# Patient Record
Sex: MALE | Race: OTHER | HISPANIC OR LATINO | Employment: OTHER | URBAN - METROPOLITAN AREA
[De-identification: names, ages, dates, MRNs, and addresses within clinical notes are randomized per-mention and may not be internally consistent; named-entity substitution may affect disease eponyms.]

---

## 2023-11-16 ENCOUNTER — OFFICE VISIT (OUTPATIENT)
Dept: URGENT CARE | Facility: CLINIC | Age: 68
End: 2023-11-16
Payer: COMMERCIAL

## 2023-11-16 VITALS
HEIGHT: 65 IN | TEMPERATURE: 98 F | WEIGHT: 174 LBS | BODY MASS INDEX: 28.99 KG/M2 | RESPIRATION RATE: 16 BRPM | HEART RATE: 68 BPM | OXYGEN SATURATION: 97 % | SYSTOLIC BLOOD PRESSURE: 140 MMHG | DIASTOLIC BLOOD PRESSURE: 95 MMHG

## 2023-11-16 DIAGNOSIS — M25.551 RIGHT HIP PAIN: ICD-10-CM

## 2023-11-16 DIAGNOSIS — M54.41 ACUTE RIGHT-SIDED LOW BACK PAIN WITH RIGHT-SIDED SCIATICA: Primary | ICD-10-CM

## 2023-11-16 PROCEDURE — 99202 PR OFFICE/OUTPT VISIT, NEW, LEVL II, 15-29 MIN: ICD-10-PCS | Mod: 25,S$GLB,, | Performed by: PHYSICIAN ASSISTANT

## 2023-11-16 PROCEDURE — 73502 XR HIP WITH PELVIS WHEN PERFORMED, 2 OR 3  VIEWS RIGHT: ICD-10-PCS | Mod: RT,S$GLB,, | Performed by: RADIOLOGY

## 2023-11-16 PROCEDURE — 99202 OFFICE O/P NEW SF 15 MIN: CPT | Mod: 25,S$GLB,, | Performed by: PHYSICIAN ASSISTANT

## 2023-11-16 PROCEDURE — 96372 THER/PROPH/DIAG INJ SC/IM: CPT | Mod: S$GLB,,, | Performed by: PHYSICIAN ASSISTANT

## 2023-11-16 PROCEDURE — 73502 X-RAY EXAM HIP UNI 2-3 VIEWS: CPT | Mod: RT,S$GLB,, | Performed by: RADIOLOGY

## 2023-11-16 PROCEDURE — 96372 PR INJECTION,THERAP/PROPH/DIAG2ST, IM OR SUBCUT: ICD-10-PCS | Mod: S$GLB,,, | Performed by: PHYSICIAN ASSISTANT

## 2023-11-16 RX ORDER — TIZANIDINE 4 MG/1
4 TABLET ORAL EVERY 6 HOURS PRN
Qty: 12 TABLET | Refills: 0 | Status: SHIPPED | OUTPATIENT
Start: 2023-11-16 | End: 2023-11-19

## 2023-11-16 RX ORDER — KETOROLAC TROMETHAMINE 30 MG/ML
30 INJECTION, SOLUTION INTRAMUSCULAR; INTRAVENOUS
Status: COMPLETED | OUTPATIENT
Start: 2023-11-16 | End: 2023-11-16

## 2023-11-16 RX ADMIN — KETOROLAC TROMETHAMINE 30 MG: 30 INJECTION, SOLUTION INTRAMUSCULAR; INTRAVENOUS at 12:11

## 2023-11-16 NOTE — PROGRESS NOTES
"Subjective:      Patient ID: Mina Viveros Sr. is a 68 y.o. male.    Vitals:  height is 5' 5" (1.651 m) and weight is 78.9 kg (174 lb). His oral temperature is 97.9 °F (36.6 °C). His blood pressure is 140/95 (abnormal) and his pulse is 68. His respiration is 16 and oxygen saturation is 97%.     Chief Complaint: Back Pain    Pt presents to urgent care with back/ right hip pain.  The pain radiates all the way down to his right knee.  The pain starts whenever he sits down. It has been going on for a few weeks now. He has been taking tylenol.  They even tried OTC medications, that melts under your tongue.     Back Pain  This is a new problem. The current episode started 1 to 4 weeks ago. The problem occurs constantly. The problem is unchanged. The pain radiates to the right thigh and right knee. The pain is mild. The symptoms are aggravated by sitting. Pertinent negatives include no abdominal pain, chest pain, fever, headaches or numbness. Treatments tried: tylenol. The treatment provided mild relief.       Constitution: Negative for chills, sweating, fatigue and fever.   HENT:  Negative for ear pain, drooling, congestion, sore throat, trouble swallowing and voice change.    Neck: Negative for neck pain, neck stiffness and painful lymph nodes.   Cardiovascular:  Negative for chest pain, leg swelling, palpitations, sob on exertion and passing out.   Eyes:  Negative for eye discharge, eye itching, eye pain, eye redness and eyelid swelling.   Respiratory:  Negative for chest tightness, cough, sputum production, bloody sputum, shortness of breath, stridor and wheezing.    Gastrointestinal:  Negative for abdominal pain, abdominal bloating, nausea, vomiting, constipation, diarrhea and heartburn.   Genitourinary:  Negative for urine decreased.   Musculoskeletal:  Positive for back pain and pain with walking. Negative for joint pain, joint swelling, abnormal ROM of joint, muscle cramps and muscle ache.   Skin:  Negative for " rash and hives.   Allergic/Immunologic: Negative for hives, itching and sneezing.   Neurological:  Negative for dizziness, light-headedness, passing out, loss of balance, headaches, altered mental status, loss of consciousness, numbness and seizures.   Hematologic/Lymphatic: Negative for swollen lymph nodes.   Psychiatric/Behavioral:  Negative for altered mental status and nervous/anxious. The patient is not nervous/anxious.       Objective:     Physical Exam   Constitutional: He is oriented to person, place, and time. He appears well-developed. He is cooperative.   HENT:   Head: Normocephalic and atraumatic.   Ears:   Right Ear: Hearing, tympanic membrane, external ear and ear canal normal.   Left Ear: Hearing, tympanic membrane, external ear and ear canal normal.   Nose: Nose normal. No mucosal edema or nasal deformity. No epistaxis. Right sinus exhibits no maxillary sinus tenderness and no frontal sinus tenderness. Left sinus exhibits no maxillary sinus tenderness and no frontal sinus tenderness.   Mouth/Throat: Uvula is midline, oropharynx is clear and moist and mucous membranes are normal. No trismus in the jaw. Normal dentition. No uvula swelling.   Eyes: Conjunctivae and lids are normal.   Neck: Trachea normal and phonation normal. Neck supple.   Cardiovascular: Normal rate, regular rhythm, normal heart sounds and normal pulses.   Pulmonary/Chest: Effort normal and breath sounds normal.   Abdominal: Normal appearance and bowel sounds are normal. Soft.   Musculoskeletal: Normal range of motion.         General: Normal range of motion.   Neurological: He is alert and oriented to person, place, and time. He exhibits normal muscle tone.   Skin: Skin is warm, dry and intact.   Psychiatric: His speech is normal and behavior is normal. Judgment and thought content normal.   Nursing note and vitals reviewed.      Assessment:     1. Acute right-sided low back pain with right-sided sciatica    2. Right hip pain         Plan:       Acute right-sided low back pain with right-sided sciatica    Right hip pain  -     X-Ray Hip 2 or 3 views Right (with Pelvis when performed); Future; Expected date: 11/16/2023  -     Ambulatory referral/consult to Family Practice    Other orders  -     ketorolac injection 30 mg  -     tiZANidine (ZANAFLEX) 4 MG tablet; Take 1 tablet (4 mg total) by mouth every 6 (six) hours as needed (hip pain).  Dispense: 12 tablet; Refill: 0        Patient Instructions     You must understand that you've received an Urgent Care treatment only and that you may be released before all your medical problems are known or treated. You, the patient, will arrange for follow up care as instructed.  Follow up with your PCP or specialty clinic as directed if not improved or as needed. You can call [phone] to schedule an appointment with the appropriate provider.  If your condition worsens we recommend that you receive another evaluation at the Emergency Department for any concerns or worsening of condition.  Patient aware and verbalized understanding.

## 2023-11-16 NOTE — PATIENT INSTRUCTIONS
You must understand that you've received an Urgent Care treatment only and that you may be released before all your medical problems are known or treated. You, the patient, will arrange for follow up care as instructed.  Follow up with your PCP or specialty clinic as directed if not improved or as needed. You can call [phone] to schedule an appointment with the appropriate provider.  If your condition worsens we recommend that you receive another evaluation at the Emergency Department for any concerns or worsening of condition.  Patient aware and verbalized understanding.

## 2024-01-11 ENCOUNTER — TELEPHONE (OUTPATIENT)
Dept: FAMILY MEDICINE | Facility: CLINIC | Age: 69
End: 2024-01-11
Payer: MEDICARE

## 2024-01-11 NOTE — TELEPHONE ENCOUNTER
----- Message from Chela Way sent at 1/10/2024  1:56 PM CST -----  Regarding: eca  Contact: patient  Type:  Sooner Appointment Request    Caller is requesting a sooner appointment.  Caller declined first available appointment listed below.  Caller will not accept being placed on the waitlist and is requesting a message be sent to doctor.    Name of Caller:  patient   When is the first available appointment?    Symptoms:  eca   Would the patient rather a call back or a response via [facility]? Call back   Best Call Back Number:  [phone]    Additional Information:  call to have pt scheduled wife is a pt of yours thanks!

## 2024-01-22 ENCOUNTER — OFFICE VISIT (OUTPATIENT)
Dept: ORTHOPEDICS | Facility: CLINIC | Age: 69
End: 2024-01-22
Payer: COMMERCIAL

## 2024-01-22 VITALS — BODY MASS INDEX: 28.99 KG/M2 | WEIGHT: 174 LBS | HEIGHT: 65 IN

## 2024-01-22 DIAGNOSIS — M16.11 PRIMARY OSTEOARTHRITIS OF RIGHT HIP: Primary | ICD-10-CM

## 2024-01-22 PROCEDURE — 3008F BODY MASS INDEX DOCD: CPT | Mod: CPTII,S$GLB,, | Performed by: STUDENT IN AN ORGANIZED HEALTH CARE EDUCATION/TRAINING PROGRAM

## 2024-01-22 PROCEDURE — 1159F MED LIST DOCD IN RCRD: CPT | Mod: CPTII,S$GLB,, | Performed by: STUDENT IN AN ORGANIZED HEALTH CARE EDUCATION/TRAINING PROGRAM

## 2024-01-22 PROCEDURE — 3288F FALL RISK ASSESSMENT DOCD: CPT | Mod: CPTII,S$GLB,, | Performed by: STUDENT IN AN ORGANIZED HEALTH CARE EDUCATION/TRAINING PROGRAM

## 2024-01-22 PROCEDURE — 1125F AMNT PAIN NOTED PAIN PRSNT: CPT | Mod: CPTII,S$GLB,, | Performed by: STUDENT IN AN ORGANIZED HEALTH CARE EDUCATION/TRAINING PROGRAM

## 2024-01-22 PROCEDURE — 99999 PR PBB SHADOW E&M-EST. PATIENT-LVL III: CPT | Mod: PBBFAC,,, | Performed by: STUDENT IN AN ORGANIZED HEALTH CARE EDUCATION/TRAINING PROGRAM

## 2024-01-22 PROCEDURE — 99204 OFFICE O/P NEW MOD 45 MIN: CPT | Mod: S$GLB,,, | Performed by: STUDENT IN AN ORGANIZED HEALTH CARE EDUCATION/TRAINING PROGRAM

## 2024-01-22 PROCEDURE — 1160F RVW MEDS BY RX/DR IN RCRD: CPT | Mod: CPTII,S$GLB,, | Performed by: STUDENT IN AN ORGANIZED HEALTH CARE EDUCATION/TRAINING PROGRAM

## 2024-01-22 PROCEDURE — 1101F PT FALLS ASSESS-DOCD LE1/YR: CPT | Mod: CPTII,S$GLB,, | Performed by: STUDENT IN AN ORGANIZED HEALTH CARE EDUCATION/TRAINING PROGRAM

## 2024-01-22 RX ORDER — LANOLIN ALCOHOL/MO/W.PET/CERES
CREAM (GRAM) TOPICAL
COMMUNITY
Start: 2024-01-18

## 2024-01-22 RX ORDER — DICLOFENAC SODIUM 75 MG/1
75 TABLET, DELAYED RELEASE ORAL 2 TIMES DAILY
Qty: 60 TABLET | Refills: 1 | Status: SHIPPED | OUTPATIENT
Start: 2024-01-22 | End: 2024-02-28 | Stop reason: SDUPTHER

## 2024-01-22 RX ORDER — FLUTICASONE PROPIONATE 50 MCG
SPRAY, SUSPENSION (ML) NASAL
COMMUNITY

## 2024-01-22 NOTE — PROGRESS NOTES
Patient ID: Mina Viveros Sr.  YOB: 1955  MRN: [id number]    Chief Complaint: Pain of the Right Hip      Referred By: Self for Right Hip Pain    History of Present Illness: Mina Viveros Sr. is a right-hand dominant 68 y.o. male who presents today with right hip pain.  Patient states that he has been experiencing right hip pain for several weeks to months and that he went to Urgent Care for relief.  He states that he received a Toradol IM injection which helped with his symptoms.  He also has been taking his wife's oral diclofenac which has been helping with his symptoms.             Past Medical History:   Past Medical History:   Diagnosis Date    Allergy     Asthma     Childhood Asthma    Kidney stone 2000    x 1 - passed     History reviewed. No pertinent surgical history.  History reviewed. No pertinent family history.  Social History     Socioeconomic History    Marital status:    Tobacco Use    Smoking status: Never    Smokeless tobacco: Never     Medication List with Changes/Refills   New Medications    DICLOFENAC (VOLTAREN) 75 MG EC TABLET    Take 1 tablet (75 mg total) by mouth 2 (two) times daily.   Current Medications    CYANOCOBALAMIN (VITAMIN B-12) 1000 MCG TABLET        FLUTICASONE PROPIONATE (FLONASE ALLERGY RELIEF) 50 MCG/ACTUATION NASAL SPRAY    1 spray in each nostril Nasally Once a day for 30 day(s)    LORATADINE-PSEUDOEPHEDRINE  MG (CLARITIN-D 24-HOUR)  MG PER 24 HR TABLET    Take 1 tablet by mouth once daily.    MULTIVITAMIN WITH MINERALS TABLET    Take 1 tablet by mouth once daily.     Review of patient's allergies indicates:  No Known Allergies    Physical Exam:   Body mass index is 28.96 kg/m².    GENERAL: Well appearing, in no acute distress.  HEAD: Normocephalic and atraumatic.  ENT: External ears and nose grossly normal.  EYES: EOMI bilaterally  PULMONARY: Respirations are grossly even and non-labored.  NEURO: Awake, alert, and oriented x  3.  SKIN: No obvious rashes appreciated.  PSYCH: Mood & affect are appropriate.    Detailed MSK exam:     Right hip exam:  -ROM: internal rotation 20, external rotation 50  -MARILYNN negative, FADIR negative, Mik negative  -Muscle strength 5/5 flexion, 5/5 extension, 5/5 abduction, 5/5 adduction  -negative log roll  -negative straight leg raise  -TTP: anterior groin    Left hip exam:  -ROM: internal rotation 30, external rotation 60  -MARILYNN negative, FADIR negative, Mik negative  -Muscle strength 5/5 flexion, 5/5 extension, 5/5 abduction, 5/5 adduction  -negative log roll  -negative straight leg raise  -TTP: none      Imaging:  X-Ray Hip 2 or 3 views Right (with Pelvis when performed)  Narrative: EXAMINATION:  XR HIP WITH PELVIS WHEN PERFORMED, 2 OR 3  VIEWS RIGHT    CLINICAL HISTORY:  Pain in right hip    TECHNIQUE:  AP view of the pelvis and frog leg lateral view of the right hip were performed.    COMPARISON:  None    FINDINGS:  No acute fracture or dislocation.  Heights of the hip joints appear maintained.  Impression: No acute fracture or dislocation    Electronically signed by: Mary Ann Eason MD  Date:    11/16/2023  Time:    12:32        Relevant imaging results were reviewed and interpreted by me and per my read shows minimal early arthritic changes right hip.  This was discussed with the patient and / or family today.     Assessment:  Mina Viveros  is a 68 y.o. male presenting with right hip pain.   History, physical and radiographs are consistent with a likely diagnosis of mild early OA.   Plan: voltaren tablets. Ice/heat, voltaren gel, lidocaine patches as needed. Consider steroid injection if not improving. Consider PT referral. Continue conservative management for pain.   Follow up as needed. All questions answered.      Primary osteoarthritis of right hip  -     diclofenac (VOLTAREN) 75 MG EC tablet; Take 1 tablet (75 mg total) by mouth 2 (two) times daily.  Dispense: 60 tablet; Refill: 1            A copy of today's visit note has been sent to the referring provider.     Electronically signed:  Yosvany Briones MD, MPH  01/22/2024  2:40 PM

## 2024-01-22 NOTE — PATIENT INSTRUCTIONS
Assessment:  Mina Viveros  is a 68 y.o. male   Chief Complaint   Patient presents with    Right Hip - Pain       Encounter Diagnosis   Name Primary?    Primary osteoarthritis of right hip Yes        Plan:  Prescription for oral Diclofenac to be taken as directed  Apply topical diclofenac (Voltaren) up to 4 times a day to the affected area.  It can be bought over the counter at any local pharmacy.    Patient may use ice and heat as needed for pain every 2 hours for 15 minutes  Follow up as needed          Follow-up: as needed.    Thank you for choosing [facility] and Dr. Yosvany Briones for your orthopedic & sports medicine care. It is our goal to provide you with exceptional care that will help keep you healthy, active, and get you back in the game.    Please do not hesitate to reach out to us via email, phone, or MyChart with any questions, concerns, or feedback.    If you felt that you received exemplary care today, please consider leaving us feedback on [email or web address] at:  [email or web address]    If you are experiencing pain/discomfort ,or have questions after 5pm and would like to be connected to the [facility] on-call team, please call this number and specify which Sports Medicine provider is treating you: [phone]

## 2024-02-28 ENCOUNTER — OFFICE VISIT (OUTPATIENT)
Dept: FAMILY MEDICINE | Facility: CLINIC | Age: 69
End: 2024-02-28
Payer: MEDICARE

## 2024-02-28 ENCOUNTER — LAB VISIT (OUTPATIENT)
Dept: LAB | Facility: HOSPITAL | Age: 69
End: 2024-02-28
Attending: FAMILY MEDICINE
Payer: MEDICARE

## 2024-02-28 VITALS
OXYGEN SATURATION: 98 % | BODY MASS INDEX: 29.51 KG/M2 | SYSTOLIC BLOOD PRESSURE: 126 MMHG | DIASTOLIC BLOOD PRESSURE: 86 MMHG | HEIGHT: 65 IN | HEART RATE: 70 BPM | WEIGHT: 177.13 LBS

## 2024-02-28 DIAGNOSIS — Z00.00 ENCOUNTER FOR MEDICAL EXAMINATION TO ESTABLISH CARE: ICD-10-CM

## 2024-02-28 DIAGNOSIS — Z00.00 ENCOUNTER FOR MEDICAL EXAMINATION TO ESTABLISH CARE: Primary | ICD-10-CM

## 2024-02-28 DIAGNOSIS — Z13.6 ENCOUNTER FOR LIPID SCREENING FOR CARDIOVASCULAR DISEASE: ICD-10-CM

## 2024-02-28 DIAGNOSIS — Z12.5 ENCOUNTER FOR PROSTATE CANCER SCREENING: ICD-10-CM

## 2024-02-28 DIAGNOSIS — Z13.220 ENCOUNTER FOR LIPID SCREENING FOR CARDIOVASCULAR DISEASE: ICD-10-CM

## 2024-02-28 DIAGNOSIS — Z11.59 NEED FOR HEPATITIS C SCREENING TEST: ICD-10-CM

## 2024-02-28 DIAGNOSIS — Z79.899 ENCOUNTER FOR LONG-TERM (CURRENT) USE OF MEDICATIONS: ICD-10-CM

## 2024-02-28 DIAGNOSIS — M16.11 PRIMARY OSTEOARTHRITIS OF RIGHT HIP: ICD-10-CM

## 2024-02-28 DIAGNOSIS — Z12.83 SKIN CANCER SCREENING: ICD-10-CM

## 2024-02-28 DIAGNOSIS — Z12.11 COLON CANCER SCREENING: ICD-10-CM

## 2024-02-28 DIAGNOSIS — N40.1 BPH WITH OBSTRUCTION/LOWER URINARY TRACT SYMPTOMS: ICD-10-CM

## 2024-02-28 DIAGNOSIS — Z23 NEED FOR VACCINATION: ICD-10-CM

## 2024-02-28 DIAGNOSIS — N13.8 BPH WITH OBSTRUCTION/LOWER URINARY TRACT SYMPTOMS: ICD-10-CM

## 2024-02-28 PROBLEM — I10 HYPERTENSION: Status: ACTIVE | Noted: 2024-02-28

## 2024-02-28 LAB
ALBUMIN SERPL BCP-MCNC: 4 G/DL (ref 3.5–5.2)
ALP SERPL-CCNC: 68 U/L (ref 55–135)
ALT SERPL W/O P-5'-P-CCNC: 35 U/L (ref 10–44)
ANION GAP SERPL CALC-SCNC: 10 MMOL/L (ref 8–16)
AST SERPL-CCNC: 29 U/L (ref 10–40)
BASOPHILS # BLD AUTO: 0.1 K/UL (ref 0–0.2)
BASOPHILS NFR BLD: 1.3 % (ref 0–1.9)
BILIRUB SERPL-MCNC: 0.7 MG/DL (ref 0.1–1)
BUN SERPL-MCNC: 23 MG/DL (ref 8–23)
CALCIUM SERPL-MCNC: 9.8 MG/DL (ref 8.7–10.5)
CHLORIDE SERPL-SCNC: 106 MMOL/L (ref 95–110)
CHOLEST SERPL-MCNC: 261 MG/DL (ref 120–199)
CHOLEST/HDLC SERPL: 5.3 {RATIO} (ref 2–5)
CO2 SERPL-SCNC: 24 MMOL/L (ref 23–29)
COMPLEXED PSA SERPL-MCNC: 0.93 NG/ML (ref 0–4)
CREAT SERPL-MCNC: 1.3 MG/DL (ref 0.5–1.4)
DIFFERENTIAL METHOD BLD: ABNORMAL
EOSINOPHIL # BLD AUTO: 0.2 K/UL (ref 0–0.5)
EOSINOPHIL NFR BLD: 2 % (ref 0–8)
ERYTHROCYTE [DISTWIDTH] IN BLOOD BY AUTOMATED COUNT: 14.6 % (ref 11.5–14.5)
EST. GFR  (NO RACE VARIABLE): >60 ML/MIN/1.73 M^2
ESTIMATED AVG GLUCOSE: 117 MG/DL (ref 68–131)
GLUCOSE SERPL-MCNC: 89 MG/DL (ref 70–110)
HBA1C MFR BLD: 5.7 % (ref 4–5.6)
HCT VFR BLD AUTO: 59.9 % (ref 40–54)
HCV AB SERPL QL IA: NORMAL
HDLC SERPL-MCNC: 49 MG/DL (ref 40–75)
HDLC SERPL: 18.8 % (ref 20–50)
HGB BLD-MCNC: 19.1 G/DL (ref 14–18)
IMM GRANULOCYTES # BLD AUTO: 0.03 K/UL (ref 0–0.04)
IMM GRANULOCYTES NFR BLD AUTO: 0.4 % (ref 0–0.5)
LDLC SERPL CALC-MCNC: 171.6 MG/DL (ref 63–159)
LYMPHOCYTES # BLD AUTO: 2.4 K/UL (ref 1–4.8)
LYMPHOCYTES NFR BLD: 30.3 % (ref 18–48)
MCH RBC QN AUTO: 28.8 PG (ref 27–31)
MCHC RBC AUTO-ENTMCNC: 31.9 G/DL (ref 32–36)
MCV RBC AUTO: 90 FL (ref 82–98)
MONOCYTES # BLD AUTO: 0.7 K/UL (ref 0.3–1)
MONOCYTES NFR BLD: 8.9 % (ref 4–15)
NEUTROPHILS # BLD AUTO: 4.5 K/UL (ref 1.8–7.7)
NEUTROPHILS NFR BLD: 57.1 % (ref 38–73)
NONHDLC SERPL-MCNC: 212 MG/DL
NRBC BLD-RTO: 0 /100 WBC
PLATELET # BLD AUTO: 221 K/UL (ref 150–450)
PMV BLD AUTO: 13 FL (ref 9.2–12.9)
POTASSIUM SERPL-SCNC: 5.3 MMOL/L (ref 3.5–5.1)
PROT SERPL-MCNC: 7.8 G/DL (ref 6–8.4)
RBC # BLD AUTO: 6.64 M/UL (ref 4.6–6.2)
SODIUM SERPL-SCNC: 140 MMOL/L (ref 136–145)
TRIGL SERPL-MCNC: 202 MG/DL (ref 30–150)
TSH SERPL DL<=0.005 MIU/L-ACNC: 2.14 UIU/ML (ref 0.4–4)
WBC # BLD AUTO: 7.89 K/UL (ref 3.9–12.7)

## 2024-02-28 PROCEDURE — 84153 ASSAY OF PSA TOTAL: CPT | Performed by: FAMILY MEDICINE

## 2024-02-28 PROCEDURE — 80061 LIPID PANEL: CPT | Performed by: FAMILY MEDICINE

## 2024-02-28 PROCEDURE — 99215 OFFICE O/P EST HI 40 MIN: CPT | Mod: PBBFAC,PO | Performed by: FAMILY MEDICINE

## 2024-02-28 PROCEDURE — 84443 ASSAY THYROID STIM HORMONE: CPT | Performed by: FAMILY MEDICINE

## 2024-02-28 PROCEDURE — 86803 HEPATITIS C AB TEST: CPT | Performed by: FAMILY MEDICINE

## 2024-02-28 PROCEDURE — 83036 HEMOGLOBIN GLYCOSYLATED A1C: CPT | Performed by: FAMILY MEDICINE

## 2024-02-28 PROCEDURE — 85025 COMPLETE CBC W/AUTO DIFF WBC: CPT | Performed by: FAMILY MEDICINE

## 2024-02-28 PROCEDURE — 80053 COMPREHEN METABOLIC PANEL: CPT | Performed by: FAMILY MEDICINE

## 2024-02-28 PROCEDURE — 36415 COLL VENOUS BLD VENIPUNCTURE: CPT | Mod: PO | Performed by: FAMILY MEDICINE

## 2024-02-28 PROCEDURE — 99387 INIT PM E/M NEW PAT 65+ YRS: CPT | Mod: GZ,S$PBB,, | Performed by: FAMILY MEDICINE

## 2024-02-28 PROCEDURE — 99999 PR PBB SHADOW E&M-EST. PATIENT-LVL V: CPT | Mod: PBBFAC,,, | Performed by: FAMILY MEDICINE

## 2024-02-28 RX ORDER — TAMSULOSIN HYDROCHLORIDE 0.4 MG/1
0.4 CAPSULE ORAL DAILY
Qty: 30 CAPSULE | Refills: 11 | Status: SHIPPED | OUTPATIENT
Start: 2024-02-28 | End: 2025-02-27

## 2024-02-28 RX ORDER — DICLOFENAC SODIUM 75 MG/1
75 TABLET, DELAYED RELEASE ORAL 2 TIMES DAILY
Qty: 60 TABLET | Refills: 1 | Status: SHIPPED | OUTPATIENT
Start: 2024-02-28

## 2024-02-28 NOTE — PROGRESS NOTES
This note is specifically for wellness visit performed today.   WELLNESS EXAM    Patient ID: Mina Viveros Sr. is a 68 y.o. male.  has a past medical history of Allergy, Asthma, and Kidney stone (2000).   Chief Complaint:  Encounter for wellness exam    Well Adult Physical: Patient here for a comprehensive physical exam.The patient reports chronic problems.  New patient.  Patient transitioning care fromPrevious PCP- none  Ortho- [facility] - sciatica   Chiro in [place]   Urology Mike Mane MD   February 2024:  History of Present Illness  The patient is a 68-year-old male who is here to establish care and annual visit. No previous PCP. He has only seen Orthopedics for sciatic pain. He put him on diclofenac 75 mg. He is trying to get off of it, but he would like a refill just in case he has a flare up. He requested to do a Cologuard because he is due for a colonoscopy and has never had one. He is fasting.    He was seeing no one for primary care.  He has spent a lot of time out in the sun as a house .  He has not had skin cancer. He has not seen a dermatologist. He does not see any specialist. He went to urgent care for colds. He has sciatica in the right leg. He was told he had arthritis of his hip. He had x-rays from his chiropractor in [place]. He went 4 or 5 times. He stopped going because he was doing exercises and stretches for the pain. He denies any lower back pain. He has pain when he is sitting down. His pain is mild. He was taking 2 pills of diclofenac a day.    He wakes up 2 to 3 times at night to urinate. He only woke up once last night. He has mild erectile dysfunction. He saw a urologist, Dr. Mane, in [place] in 2013 for prostatitis. He takes aloe vera, added oil, honey, and lemon before he goes to bed. He has never had a colonoscopy. He denies any constipation.   He denies any family history of colon cancer.   He is due for a tetanus vaccine.     Do you take any herbs or  "supplements that were not prescribed by a doctor?  Yes  Are you taking calcium supplements? no    History: UROLOGIST:   Date last PSA: No results found for: "PSA"   The natural history of prostate cancer and ongoing controversy regarding screening and potential treatment outcomes of prostate cancer has been discussed with the patient. The meaning of a false positive PSA and a false negative PSA has been discussed. He indicates understanding of the limitations of this screening test and wishes  to proceed with screening PSA testing.  No results found for: "TESTOSTERONE" No results found for: "TESTOSTERONE", "TOTALTESTOST", "BIOTESTO"   Colon cancer screening:  We discussed colon cancer screening in detail.  Patient would prefer to do Cologuard however after discussion different modalities patient agrees to proceed with colonoscopy.  Discussed risk and benefits.  The patient has no Health Maintenance topics of status Not Due   ==============================================  History reviewed.   Health Maintenance Due   Topic Date Due    PROSTATE-SPECIFIC ANTIGEN  Never done    Hepatitis C Screening  Never done    Lipid Panel  Never done    TETANUS VACCINE  Never done    Hemoglobin A1c (Diabetic Prevention Screening)  Never done    Colorectal Cancer Screening  Never done       Past Medical History:  Past Medical History:   Diagnosis Date    Allergy     Asthma     Childhood Asthma    Kidney stone 2000    x 1 - passed     History reviewed. No pertinent surgical history.  Review of patient's allergies indicates:  No Known Allergies  Current Outpatient Medications on File Prior to Visit   Medication Sig Dispense Refill    cyanocobalamin (VITAMIN B-12) 1000 MCG tablet       fluticasone propionate (FLONASE ALLERGY RELIEF) 50 mcg/actuation nasal spray 1 spray in each nostril Nasally Once a day for 30 day(s)      multivitamin with minerals tablet Take 1 tablet by mouth once daily.      [DISCONTINUED] diclofenac (VOLTAREN) 75 " MG EC tablet Take 1 tablet (75 mg total) by mouth 2 (two) times daily. 60 tablet 1    [DISCONTINUED] loratadine-pseudoephedrine  mg (CLARITIN-D 24-HOUR)  mg per 24 hr tablet Take 1 tablet by mouth once daily.       No current facility-administered medications on file prior to visit.     Social History     Socioeconomic History    Marital status:    Tobacco Use    Smoking status: Never    Smokeless tobacco: Never   Substance and Sexual Activity    Alcohol use: Not Currently    Drug use: Never    Sexual activity: Yes     Partners: Female     Birth control/protection: None     Social Determinants of Health     Financial Resource Strain: Low Risk  (2/27/2024)    Overall Financial Resource Strain (CARDIA)     Difficulty of Paying Living Expenses: Not very hard   Food Insecurity: No Food Insecurity (2/27/2024)    Hunger Vital Sign     Worried About Running Out of Food in the Last Year: Never true     Ran Out of Food in the Last Year: Never true   Transportation Needs: No Transportation Needs (2/27/2024)    PRAPARE - Transportation     Lack of Transportation (Medical): No     Lack of Transportation (Non-Medical): No   Physical Activity: Unknown (2/27/2024)    Exercise Vital Sign     Days of Exercise per Week: Patient declined   Stress: Patient Declined (2/27/2024)    [employer] - Occupational Stress Questionnaire     Feeling of Stress : Patient declined   Social Connections: Unknown (2/27/2024)    Social Connection and Isolation Panel [NHANES]     Frequency of Communication with Friends and Family: More than three times a week     Frequency of Social Gatherings with Friends and Family: Once a week     Active Member of Clubs or Organizations: No     Marital Status:    Housing Stability: Low Risk  (2/27/2024)    Housing Stability Vital Sign     Unable to Pay for Housing in the Last Year: No     Number of Places Lived in the Last Year: 1     Unstable Housing in the Last  Year: No     Family History   Problem Relation Age of Onset    Arthritis Mother        Review of Systems   Constitutional:  Negative for chills, fatigue, fever and unexpected weight change.   HENT:  Negative for ear pain and sore throat.    Eyes:  Negative for redness and visual disturbance.   Respiratory:  Negative for cough and shortness of breath.    Cardiovascular:  Negative for chest pain and palpitations.   Gastrointestinal:  Negative for nausea and vomiting.   Endocrine: Negative for cold intolerance and heat intolerance.   Genitourinary:  Negative for difficulty urinating and hematuria.   Musculoskeletal:  Negative for arthralgias and myalgias.   Skin:  Negative for rash and wound.   Allergic/Immunologic: Negative for environmental allergies and food allergies.   Neurological:  Negative for weakness and headaches.   Hematological:  Negative for adenopathy. Does not bruise/bleed easily.   Psychiatric/Behavioral:  Negative for sleep disturbance. The patient is not nervous/anxious.         Objective:     Vitals:    02/28/24 0810   BP: 126/86   Pulse: 70    Body mass index is 29.47 kg/m².  Physical Exam  Vitals and nursing note reviewed.   Constitutional:       General: He is not in acute distress.     Appearance: He is well-developed. He is not diaphoretic.   HENT:      Head: Normocephalic and atraumatic.      Right Ear: External ear normal.      Left Ear: External ear normal.      Ears:      Comments: Scattered cerumen on tympanic membrane bilaterally, otherwise clear.     Nose: Nose normal. No rhinorrhea.   Eyes:      Extraocular Movements: Extraocular movements intact.      Pupils: Pupils are equal, round, and reactive to light.   Cardiovascular:      Rate and Rhythm: Normal rate.      Pulses: Normal pulses.   Pulmonary:      Effort: Pulmonary effort is normal. No respiratory distress.      Breath sounds: Normal breath sounds.   Abdominal:      General: Bowel sounds are normal.      Palpations: Abdomen is  soft.   Musculoskeletal:         General: No tenderness or deformity. Normal range of motion.      Cervical back: Normal range of motion and neck supple.   Skin:     General: Skin is warm and dry.      Capillary Refill: Capillary refill takes less than 2 seconds.      Findings: No rash.   Neurological:      General: No focal deficit present.      Mental Status: He is alert and oriented to person, place, and time.      Cranial Nerves: No cranial nerve deficit.      Motor: No weakness.      Gait: Gait normal.   Psychiatric:         Attention and Perception: He is attentive.         Mood and Affect: Mood normal. Mood is not anxious or depressed. Affect is not labile, blunt, angry or inappropriate.         Speech: He is communicative. Speech is not rapid and pressured, delayed, slurred or tangential.         Behavior: Behavior normal. Behavior is not agitated, slowed, aggressive, withdrawn, hyperactive or combative.         Thought Content: Thought content normal. Thought content is not paranoid or delusional. Thought content does not include homicidal or suicidal ideation. Thought content does not include homicidal or suicidal plan.         Cognition and Memory: Memory is not impaired.         Judgment: Judgment normal. Judgment is not impulsive or inappropriate.          No results found for any previous visit.        Assessment / Plan:    1.  Routine health exam-patient here for annual wellness exam.  Labs ordered.  Health maintenance was reviewed and ordered.  Anticipatory guidance: Don't smoke.  Healthy diet and regular exercise recommended. Vaccine recommendations discussed.  See orders.  Reviewed Anticipatory guidance, risk factor reduction interventions or counseling as needed, Complete history , physical was completed today.  Complete and thorough medication reconciliation was performed.  Discussed risks and benefits of medications.  Advised patient on orders and health maintenance.  We discussed old records  and old labs if available.  Will request any records not available through epic.  Continue current medications listed on your summary sheet.  All questions were answered. Patient had no further concerns. Advised of diagnoses and plan. Follow up as planned or return sooner if symptoms persist or worsen.   Assessment & Plan  1. History of sun exposure.    He was recommended to see a dermatologist at least once a year. I will refer him to Dr. Davenport.    2. Sciatica, right leg.  He has arthritis of his hip. I will refill his diclofenac. He can take it as needed. He can try turmeric or glucosamine chondroitin.    3. Nocturia.  I will prescribe Flomax to be taken at night. He will let me know if it is not better after 1 to 2 weeks.    4. Health maintenance.  He is due for a colonoscopy. I will obtain blood work to screen for blood counts, anemia, kidneys, liver, thyroid, diabetes, cholesterol, PSA for prostate cancer screening, and hepatitis C. He will receive his tetanus vaccine, however insurance is requiring him to go to a pharmacy. He will receive his RSV vaccine. He will use Debrox ear drops. He was advised not to use Q-tips.    Follow-up  He will follow up as needed.    Orders Placed This Encounter   Procedures    CBC Auto Differential     Standing Status:   Future     Number of Occurrences:   1     Standing Expiration Date:   4/28/2025    Comprehensive Metabolic Panel     Standing Status:   Future     Number of Occurrences:   1     Standing Expiration Date:   4/28/2025    Hemoglobin A1C     Standing Status:   Future     Number of Occurrences:   1     Standing Expiration Date:   4/28/2025    Lipid Panel     Standing Status:   Future     Number of Occurrences:   1     Standing Expiration Date:   4/28/2025    PSA, Screening     Standing Status:   Future     Number of Occurrences:   1     Standing Expiration Date:   4/28/2025    TSH     Standing Status:   Future     Number of Occurrences:   1     Standing Expiration  Date:   4/28/2025    Hepatitis C Antibody     Standing Status:   Future     Number of Occurrences:   1     Standing Expiration Date:   5/28/2025     Order Specific Question:   Release to patient     Answer:   Immediate    Ambulatory referral/consult to Dermatology     Standing Status:   Future     Standing Expiration Date:   3/28/2025     Referral Priority:   Routine     Referral Type:   Consultation     Referral Reason:   Specialty Services Required     Referred to Provider:   Susan Luther MD     Requested Specialty:   Dermatology     Number of Visits Requested:   1    Ambulatory referral/consult to Endo Procedure      Standing Status:   Future     Standing Expiration Date:   8/31/2024     Referral Priority:   Routine     Referral Type:   Consultation     Number of Visits Requested:   1       Medications Ordered This Encounter   Medications    diclofenac (VOLTAREN) 75 MG EC tablet     Sig: Take 1 tablet (75 mg total) by mouth 2 (two) times daily.     Dispense:  60 tablet     Refill:  1    diphth,pertus,acell,,tetanus (BOOSTRIX) 2.5-8-5 Lf-mcg-Lf/0.5mL Susp     Sig: Inject 0.5 mLs into the muscle once. for 1 dose     Dispense:  0.5 mL     Refill:  0    tamsulosin (FLOMAX) 0.4 mg Cap     Sig: Take 1 capsule (0.4 mg total) by mouth once daily.     Dispense:  30 capsule     Refill:  11      Future Appointments       Date Specialty Appt Notes    2/28/2024 Lab            Juan Manuel Mireles MD

## 2024-02-28 NOTE — PATIENT INSTRUCTIONS
Follow up in about 1 year (around 2/28/2025), or if symptoms worsen or fail to improve, for Annual Wellness Exam.     Dear patient,   As a result of recent federal legislation (The Federal Cures Act), you may receive lab or pathology results from your visit in your [facility] account before your physician is able to contact you. Your physician or their representative will relay the results to you with their recommendations at their soonest availability.     If no improvement in symptoms or symptoms worsen, please be advised to call MD, follow-up at clinic and/or go to ER if becomes severe.    Juan Manuel Mireles M.D.        We Offer TELEHEALTH & Same Day Appointments!   Book your Telehealth appointment with me through my nurse or   Clinic appointments on [employer]!    [address] [place] [address]    Office: [phone]   FAX: [phone]    Check out my Facebook Page and Follow Me at: [email or web address]    Check out my website at [employer] by clicking on: [email or web address]    To Schedule appointments online, go to [employer]: [email or web address]

## 2024-02-29 DIAGNOSIS — E78.2 MIXED HYPERLIPIDEMIA: ICD-10-CM

## 2024-02-29 DIAGNOSIS — Z79.899 ENCOUNTER FOR LONG-TERM (CURRENT) USE OF MEDICATIONS: ICD-10-CM

## 2024-02-29 DIAGNOSIS — D75.1 POLYCYTHEMIA: Primary | ICD-10-CM

## 2024-02-29 DIAGNOSIS — R73.03 PREDIABETES: ICD-10-CM

## 2024-02-29 RX ORDER — ATORVASTATIN CALCIUM 10 MG/1
10 TABLET, FILM COATED ORAL DAILY
Qty: 90 TABLET | Refills: 3 | Status: SHIPPED | OUTPATIENT
Start: 2024-02-29 | End: 2025-02-28

## 2024-02-29 NOTE — PROGRESS NOTES
Make follow-up lab appointment per recommendation below.  Check to see if patient has seen the results through my chart.  If not then,  #CALL THE PATIENT# to discuss results/see if they have questions and document verification of contact. Make F/U appt if needed. [phone]    #My interpretation that was sent to them through [employer]:  Mina, I have reviewed your recent blood work.     Hepatitis-C screening is negative.  PSA screening for prostate cancer is within normal limits.  Repeat annually.  Your complete blood count is abnormal.  Red blood cell hemoglobin and hematocrit are elevated consistent with polycythemia.  I recommend further evaluation by Hematology.  An order has been placed.  Your metabolic panel which shows your glucose, kidney function, electrolytes, and liver function is mostly within normal limits except for elevated potassium.  I recommend that you lower potassium levels in the diet.  Avoid potassium supplements.   Thyroid study is normal.   Your cholesterol is too high.  Your risk score is significantly elevated for risk of heart attacks and strokes.  I recommend that you start on treatment for lowering your cholesterol levels with medication.  Please let me know if you are in agreement and I will send atorvastatin 10 milligrams to the pharmacy.  Recheck lipid panel in three months.  Also recommend lifestyle modification with a low-fat high-fiber diet and increase in exercise.  We can also discuss supplements for this condition.  Your hemoglobin A1c is elevated consistent with prediabetes.  I recommend a low-carbohydrate diet and increase in exercise.  Recheck level in three months.  This test is gold standard screening test for diabetes.  It is a measures 3 months of your average blood sugar.    =========================  Also please address any outstanding health maintenance that may be due: TETANUS VACCINE Never done  Colorectal Cancer Screening Never done  RSV Vaccine (Age 60+ and Pregnant  patients)(1 - 1-dose 60+ series) Never done

## 2024-03-04 ENCOUNTER — TELEPHONE (OUTPATIENT)
Dept: HEMATOLOGY/ONCOLOGY | Facility: CLINIC | Age: 69
End: 2024-03-04
Payer: MEDICARE

## 2024-03-06 ENCOUNTER — TELEPHONE (OUTPATIENT)
Dept: HEMATOLOGY/ONCOLOGY | Facility: CLINIC | Age: 69
End: 2024-03-06
Payer: MEDICARE

## 2024-03-13 ENCOUNTER — TELEPHONE (OUTPATIENT)
Dept: HEMATOLOGY/ONCOLOGY | Facility: CLINIC | Age: 69
End: 2024-03-13
Payer: MEDICARE

## 2024-03-13 NOTE — TELEPHONE ENCOUNTER
Attempt to return call to pt's wife, Georgiana and LVM.      Hematology referral in [facility].     Ms Lopez, wife, returned call and schedule from heme referral.  Benign heme appt sched for first avail benign heme appt, May 9th; she confirmed the appt date time and location.

## 2024-03-13 NOTE — TELEPHONE ENCOUNTER
----- Message from Batool Ma sent at 3/12/2024  1:16 PM CDT -----  Type: Need Medical Advice   Who Called: wife of patient  Best callback number: [phone]  Additional Information: wife of patient returned call from last week to schedule  Please call to further assist, Thanks.

## 2024-05-09 ENCOUNTER — LAB VISIT (OUTPATIENT)
Dept: LAB | Facility: HOSPITAL | Age: 69
End: 2024-05-09
Attending: INTERNAL MEDICINE
Payer: MEDICARE

## 2024-05-09 ENCOUNTER — OFFICE VISIT (OUTPATIENT)
Dept: HEMATOLOGY/ONCOLOGY | Facility: CLINIC | Age: 69
End: 2024-05-09
Payer: MEDICARE

## 2024-05-09 VITALS
HEART RATE: 87 BPM | BODY MASS INDEX: 29.57 KG/M2 | TEMPERATURE: 97 F | DIASTOLIC BLOOD PRESSURE: 80 MMHG | RESPIRATION RATE: 18 BRPM | SYSTOLIC BLOOD PRESSURE: 128 MMHG | WEIGHT: 177.5 LBS | OXYGEN SATURATION: 97 % | HEIGHT: 65 IN

## 2024-05-09 DIAGNOSIS — D75.1 POLYCYTHEMIA: ICD-10-CM

## 2024-05-09 DIAGNOSIS — D47.1 MPN (MYELOPROLIFERATIVE NEOPLASM): ICD-10-CM

## 2024-05-09 DIAGNOSIS — D47.1 MPN (MYELOPROLIFERATIVE NEOPLASM): Primary | ICD-10-CM

## 2024-05-09 DIAGNOSIS — E78.2 MIXED HYPERLIPIDEMIA: ICD-10-CM

## 2024-05-09 LAB
ALBUMIN SERPL BCP-MCNC: 4 G/DL (ref 3.5–5.2)
ALP SERPL-CCNC: 64 U/L (ref 55–135)
ALT SERPL W/O P-5'-P-CCNC: 33 U/L (ref 10–44)
ANION GAP SERPL CALC-SCNC: 11 MMOL/L (ref 8–16)
AST SERPL-CCNC: 25 U/L (ref 10–40)
BASOPHILS # BLD AUTO: 0.13 K/UL (ref 0–0.2)
BASOPHILS NFR BLD: 1.4 % (ref 0–1.9)
BILIRUB SERPL-MCNC: 0.7 MG/DL (ref 0.1–1)
BUN SERPL-MCNC: 18 MG/DL (ref 8–23)
CALCIUM SERPL-MCNC: 9.6 MG/DL (ref 8.7–10.5)
CHLORIDE SERPL-SCNC: 107 MMOL/L (ref 95–110)
CO2 SERPL-SCNC: 25 MMOL/L (ref 23–29)
CREAT SERPL-MCNC: 1.5 MG/DL (ref 0.5–1.4)
DIFFERENTIAL METHOD BLD: ABNORMAL
EOSINOPHIL # BLD AUTO: 0.2 K/UL (ref 0–0.5)
EOSINOPHIL NFR BLD: 2.7 % (ref 0–8)
ERYTHROCYTE [DISTWIDTH] IN BLOOD BY AUTOMATED COUNT: 14.1 % (ref 11.5–14.5)
EST. GFR  (NO RACE VARIABLE): 50.7 ML/MIN/1.73 M^2
GLUCOSE SERPL-MCNC: 110 MG/DL (ref 70–110)
HCT VFR BLD AUTO: 54.4 % (ref 40–54)
HGB BLD-MCNC: 18.1 G/DL (ref 14–18)
IMM GRANULOCYTES # BLD AUTO: 0.02 K/UL (ref 0–0.04)
IMM GRANULOCYTES NFR BLD AUTO: 0.2 % (ref 0–0.5)
LYMPHOCYTES # BLD AUTO: 2.4 K/UL (ref 1–4.8)
LYMPHOCYTES NFR BLD: 26.9 % (ref 18–48)
MCH RBC QN AUTO: 28.7 PG (ref 27–31)
MCHC RBC AUTO-ENTMCNC: 33.3 G/DL (ref 32–36)
MCV RBC AUTO: 86 FL (ref 82–98)
MONOCYTES # BLD AUTO: 0.8 K/UL (ref 0.3–1)
MONOCYTES NFR BLD: 9.3 % (ref 4–15)
NEUTROPHILS # BLD AUTO: 5.3 K/UL (ref 1.8–7.7)
NEUTROPHILS NFR BLD: 59.5 % (ref 38–73)
NRBC BLD-RTO: 0 /100 WBC
PLATELET # BLD AUTO: 221 K/UL (ref 150–450)
PMV BLD AUTO: 11.3 FL (ref 9.2–12.9)
POTASSIUM SERPL-SCNC: 4.3 MMOL/L (ref 3.5–5.1)
PROT SERPL-MCNC: 7.4 G/DL (ref 6–8.4)
RBC # BLD AUTO: 6.31 M/UL (ref 4.6–6.2)
SODIUM SERPL-SCNC: 143 MMOL/L (ref 136–145)
WBC # BLD AUTO: 8.97 K/UL (ref 3.9–12.7)

## 2024-05-09 PROCEDURE — 36415 COLL VENOUS BLD VENIPUNCTURE: CPT | Mod: PN | Performed by: INTERNAL MEDICINE

## 2024-05-09 PROCEDURE — 99205 OFFICE O/P NEW HI 60 MIN: CPT | Mod: S$GLB,,, | Performed by: INTERNAL MEDICINE

## 2024-05-09 PROCEDURE — 80053 COMPREHEN METABOLIC PANEL: CPT | Mod: PN | Performed by: INTERNAL MEDICINE

## 2024-05-09 PROCEDURE — 3288F FALL RISK ASSESSMENT DOCD: CPT | Mod: CPTII,S$GLB,, | Performed by: INTERNAL MEDICINE

## 2024-05-09 PROCEDURE — 85025 COMPLETE CBC W/AUTO DIFF WBC: CPT | Mod: PN | Performed by: INTERNAL MEDICINE

## 2024-05-09 PROCEDURE — 1101F PT FALLS ASSESS-DOCD LE1/YR: CPT | Mod: CPTII,S$GLB,, | Performed by: INTERNAL MEDICINE

## 2024-05-09 PROCEDURE — 3079F DIAST BP 80-89 MM HG: CPT | Mod: CPTII,S$GLB,, | Performed by: INTERNAL MEDICINE

## 2024-05-09 PROCEDURE — 3044F HG A1C LEVEL LT 7.0%: CPT | Mod: CPTII,S$GLB,, | Performed by: INTERNAL MEDICINE

## 2024-05-09 PROCEDURE — 3008F BODY MASS INDEX DOCD: CPT | Mod: CPTII,S$GLB,, | Performed by: INTERNAL MEDICINE

## 2024-05-09 PROCEDURE — 99999 PR PBB SHADOW E&M-EST. PATIENT-LVL III: CPT | Mod: PBBFAC,,, | Performed by: INTERNAL MEDICINE

## 2024-05-09 PROCEDURE — 1126F AMNT PAIN NOTED NONE PRSNT: CPT | Mod: CPTII,S$GLB,, | Performed by: INTERNAL MEDICINE

## 2024-05-09 PROCEDURE — 1159F MED LIST DOCD IN RCRD: CPT | Mod: CPTII,S$GLB,, | Performed by: INTERNAL MEDICINE

## 2024-05-09 PROCEDURE — 3074F SYST BP LT 130 MM HG: CPT | Mod: CPTII,S$GLB,, | Performed by: INTERNAL MEDICINE

## 2024-05-09 NOTE — PROGRESS NOTES
Subjective     Patient ID: Mina Viveros Sr. is a 68 y.o. male.    Chief Complaint: No chief complaint on file.    HPI  Mr. Viveros is a 68-year-old male referred for evaluation for polycythemia.  The only CBC within our system is from 02/28/2024 and had shown a white count of 7800 per cubic mm, hemoglobin 19.1 grams/deciliter, hematocrit 59.9%, MCV 90 and platelets 221 K.      PAST MEDICAL HISTORY:  Hyperlipidemia  PAST SURGICAL HISTORY:  Unremarkable  SOCIAL HISTORY:  He is a retired [employer].  He does not smoke and does not drink alcohol more than socially.  He is   FAMILY HISTORY:  Negative for cancer        Review of Systems  Overall he feels well.  When asked, his wife confirms that he does snore at night and appears to have pauses between snoring episodes.  He also complains of at times non restorative sleep and daytime sleepiness.  His ECOG PS is 1.  He denies any anxiety, depression, easy bruising, fevers, chills, night  sweats, weight loss, nausea, vomiting, diarrhea, constipation, diplopia, blurred vision, headache, chest pain, palpitations, shortness of breath, breast pain, abdominal pain, extremity pain, or difficulty ambulating.  The remainder of the ten-point ROS, including general, skin, lymph, H/N, cardiorespiratory, GI, , Neuro, Endocrine, and psychiatric is negative.     PHYSICAL EXAMINATION  He is alert, oriented to time, place, person, pleasant, well      nourished, in no acute physical distress.                                    VITAL SIGNS:  Reviewed                                      HEENT:  Normal.  There are no nasal, oral, lip, gingival, auricular, lid,    or conjunctival lesions.  Mucosae are moist and pink, and there is no        thrush.  Pupils are equal, reactive to light and accommodation.              Extraocular muscle movements are intact.    Dentition is good.  There is no frontal or maxillary tenderness.                                   NECK:  Supple without  JVD, adenopathy, or thyromegaly.                       LUNGS:  Clear to auscultation without wheezing, rales, or rhonchi.           CARDIOVASCULAR:  Reveals an S1, S2, no murmurs, no rubs, no gallops.         ABDOMEN:  Soft, nontender, without organomegaly.  Bowel sounds are    present.                                                                     EXTREMITIES:  No cyanosis, clubbing, or edema.                                                               LYMPHATIC:  There is no cervical, axillary, or supraclavicular adenopathy.   SKIN:  Warm and moist, without petechiae, rashes, induration, or ecchymoses.           NEUROLOGIC:  DTRs are 0-1+ bilaterally, symmetrical, motor function is 5/5,  and cranial nerves are  within normal limits.    ASSESSMENT  Polycythemia.  Unclear if it is P vera versus secondary polycythemia, possibly from sleep apnea  Symptoms highly suggestive of sleep apnea      PLAN  I had a very long discussion with Mr. Viveros and his wife.  At this point we will proceed as follows:  We will obtain a repeat stat CBC today.  If his hematocrit is more than 60% he will be phlebotomized, regardless of the cause of his polycythemia  He will return in 4 days and have his EPO level in the JAK2 mutational analysis drawn.  I have explained to him that if he does have an MPN he needs to be phlebotomized to hematocrit of 45% or lower.  If, on the other hand, his JAK2 analysis is negative and P vera has been ruled out we will only phlebotomize if his hematocrit is above 60% and I would advise him to have a sleep apnea study.  His multiple questions were answered to his satisfaction.  I spent approximately 60 minutes reviewing the available records and evaluating the patient, out of which over 50% of the time was spent face to face with the patient in counseling and coordinating this patient's care.      4:20 pm ADDENDUM    Today's CBC shows a Hg of 18.1 alber/dl and Ht 54.4 %. No need for urgent  phlebotomy; he will return early next week to have his EPO and LUCERO 2 testing drawn.

## 2024-05-09 NOTE — Clinical Note
Needs a stat CBC today.  He also needs to come back on Monday and have a JAK2 mutational analysis and a EPO level drawn See me in 3-4 weeks

## 2024-05-13 ENCOUNTER — LAB VISIT (OUTPATIENT)
Dept: LAB | Facility: HOSPITAL | Age: 69
End: 2024-05-13
Attending: INTERNAL MEDICINE
Payer: MEDICARE

## 2024-05-13 DIAGNOSIS — D75.1 POLYCYTHEMIA: ICD-10-CM

## 2024-05-13 DIAGNOSIS — D47.1 MPN (MYELOPROLIFERATIVE NEOPLASM): ICD-10-CM

## 2024-05-13 PROCEDURE — 36415 COLL VENOUS BLD VENIPUNCTURE: CPT | Mod: PN | Performed by: INTERNAL MEDICINE

## 2024-05-13 PROCEDURE — 81339 MPL GENE SEQ ALYS EXON 10: CPT | Performed by: INTERNAL MEDICINE

## 2024-05-13 PROCEDURE — 81219 CALR GENE COM VARIANTS: CPT | Performed by: INTERNAL MEDICINE

## 2024-05-13 PROCEDURE — 81270 JAK2 GENE: CPT | Performed by: INTERNAL MEDICINE

## 2024-05-13 PROCEDURE — 82668 ASSAY OF ERYTHROPOIETIN: CPT | Performed by: INTERNAL MEDICINE

## 2024-05-16 LAB — EPO SERPL-ACNC: 4.9 MIU/ML (ref 2.6–18.5)

## 2024-05-18 ENCOUNTER — PATIENT MESSAGE (OUTPATIENT)
Dept: FAMILY MEDICINE | Facility: CLINIC | Age: 69
End: 2024-05-18
Payer: MEDICARE

## 2024-05-21 LAB
MPNR  FINAL DIAGNOSIS: NORMAL
MPNR  SPECIMEN TYPE: NORMAL
MPNR RESULT: NORMAL

## 2024-05-28 ENCOUNTER — TELEPHONE (OUTPATIENT)
Dept: HEMATOLOGY/ONCOLOGY | Facility: CLINIC | Age: 69
End: 2024-05-28
Payer: MEDICARE

## 2024-05-29 ENCOUNTER — TELEPHONE (OUTPATIENT)
Dept: HEMATOLOGY/ONCOLOGY | Facility: CLINIC | Age: 69
End: 2024-05-29
Payer: MEDICARE

## 2024-05-29 NOTE — TELEPHONE ENCOUNTER
----- Message from Barbara Kline sent at 5/29/2024  4:10 PM CDT -----  Type: Needs Medical Advice  Who Called:  Jessica (spouse)  Symptoms (please be specific):    How long has patient had these symptoms:    Pharmacy name and phone #:    Best Call Back Number:   Additional Information: Jessica called back to reschedule her  appt.. She stated you can schedule for anytime  and she will check his my chart for update.

## 2024-06-12 ENCOUNTER — OFFICE VISIT (OUTPATIENT)
Dept: HEMATOLOGY/ONCOLOGY | Facility: CLINIC | Age: 69
End: 2024-06-12
Payer: MEDICARE

## 2024-06-12 VITALS
TEMPERATURE: 98 F | DIASTOLIC BLOOD PRESSURE: 78 MMHG | RESPIRATION RATE: 18 BRPM | BODY MASS INDEX: 29.65 KG/M2 | HEART RATE: 72 BPM | HEIGHT: 65 IN | SYSTOLIC BLOOD PRESSURE: 130 MMHG | OXYGEN SATURATION: 99 % | WEIGHT: 177.94 LBS

## 2024-06-12 DIAGNOSIS — D75.1 POLYCYTHEMIA: Primary | ICD-10-CM

## 2024-06-12 DIAGNOSIS — G47.30 SLEEP APNEA, UNSPECIFIED TYPE: ICD-10-CM

## 2024-06-12 PROCEDURE — 99999 PR PBB SHADOW E&M-EST. PATIENT-LVL IV: CPT | Mod: PBBFAC,,,

## 2024-06-12 PROCEDURE — 1101F PT FALLS ASSESS-DOCD LE1/YR: CPT | Mod: CPTII,S$GLB,,

## 2024-06-12 PROCEDURE — 1159F MED LIST DOCD IN RCRD: CPT | Mod: CPTII,S$GLB,,

## 2024-06-12 PROCEDURE — 1160F RVW MEDS BY RX/DR IN RCRD: CPT | Mod: CPTII,S$GLB,,

## 2024-06-12 PROCEDURE — 99214 OFFICE O/P EST MOD 30 MIN: CPT | Mod: S$GLB,,,

## 2024-06-12 PROCEDURE — 3075F SYST BP GE 130 - 139MM HG: CPT | Mod: CPTII,S$GLB,,

## 2024-06-12 PROCEDURE — 3288F FALL RISK ASSESSMENT DOCD: CPT | Mod: CPTII,S$GLB,,

## 2024-06-12 PROCEDURE — 1126F AMNT PAIN NOTED NONE PRSNT: CPT | Mod: CPTII,S$GLB,,

## 2024-06-12 PROCEDURE — 3008F BODY MASS INDEX DOCD: CPT | Mod: CPTII,S$GLB,,

## 2024-06-12 PROCEDURE — 3078F DIAST BP <80 MM HG: CPT | Mod: CPTII,S$GLB,,

## 2024-06-12 PROCEDURE — 3044F HG A1C LEVEL LT 7.0%: CPT | Mod: CPTII,S$GLB,,

## 2024-06-12 NOTE — PROGRESS NOTES
PATIENT: Mina Viveros .  MRN: [id number]  DATE: 6/12/2024      Diagnosis:   1. Polycythemia    2. Sleep apnea, unspecified type        Chief Complaint: MPN (myeloproliferative neoplasm)          Subjective:    Interval History: Mr. Viveros is a 68 y.o. male who returns for follow up for polycythemia. Denies fever, chills, sob, cp, palpitations, swelling, fatigue, , abdominal pain, new bumps, lumps, bleeding, bruising. Pt and wife endorse apnea at night, with snoring and episodes of gasping for breath.     Prior History:   68-year-old male referred for evaluation for polycythemia.   CBC  from 02/28/2024 shows a white count of 7800 per cubic mm, hemoglobin 19.1 grams/deciliter, hematocrit 59.9%, MCV 90 and platelets 221 K.     CBC  from 05/9/2024 shows a white count of 8970 per cubic mm, hemoglobin 18.1 grams/deciliter, hematocrit 54.4%, MCV 86 and platelets 221 K.     Erythropoietin on 5/13/2024 was 4.9, JAK2 on 5/13/2024 was negative    Oncology History    No history exists.       Past Medical History:   Past Medical History:   Diagnosis Date    Allergy     Asthma     Childhood Asthma    Kidney stone 2000    x 1 - passed       Past Surgical HIstory: No past surgical history on file.    Family History:   Family History   Problem Relation Name Age of Onset    Arthritis Mother Lisa Rainey        Social History:  reports that he has never smoked. He has never used smokeless tobacco. He reports that he does not currently use alcohol. He reports that he does not use drugs.    Allergies:  Review of patient's allergies indicates:  No Known Allergies    Medications:  Current Outpatient Medications   Medication Sig Dispense Refill    atorvastatin (LIPITOR) 10 MG tablet Take 1 tablet (10 mg total) by mouth once daily. 90 tablet 3    cyanocobalamin (VITAMIN B-12) 1000 MCG tablet       diclofenac (VOLTAREN) 75 MG EC tablet Take 1 tablet (75 mg total) by mouth 2 (two) times daily. 60 tablet 1    fluticasone propionate  "(FLONASE ALLERGY RELIEF) 50 mcg/actuation nasal spray 1 spray in each nostril Nasally Once a day for 30 day(s)      multivitamin with minerals tablet Take 1 tablet by mouth once daily.      tamsulosin (FLOMAX) 0.4 mg Cap Take 1 capsule (0.4 mg total) by mouth once daily. 30 capsule 11     No current facility-administered medications for this visit.       Review of Systems   Constitutional:  Negative for appetite change and unexpected weight change.   HENT:  Negative for mouth sores.    Eyes:  Negative for visual disturbance.   Respiratory:  Negative for cough and shortness of breath.    Cardiovascular:  Negative for chest pain and palpitations.   Gastrointestinal:  Negative for abdominal pain and diarrhea.   Musculoskeletal:  Negative for back pain.   Skin:  Negative for rash.   Neurological:  Negative for headaches.   Hematological:  Negative for adenopathy.   Psychiatric/Behavioral:  The patient is not nervous/anxious.        ECOG Performance Status:   ECOG SCORE             Objective:      Vitals:   Vitals:    06/12/24 1441   BP: 130/78   Pulse: 72   Resp: 18   Temp: 97.6 °F (36.4 °C)   TempSrc: Temporal   SpO2: 99%   Weight: 80.7 kg (177 lb 14.6 oz)   Height: 5' 5" (1.651 m)     BMI: Body mass index is 29.61 kg/m².    Physical Exam  HENT:      Head: Normocephalic.      Nose: Nose normal.      Mouth/Throat:      Mouth: Mucous membranes are moist.      Pharynx: Oropharynx is clear.   Eyes:      Pupils: Pupils are equal, round, and reactive to light.   Cardiovascular:      Rate and Rhythm: Normal rate and regular rhythm.      Heart sounds: Normal heart sounds.   Pulmonary:      Breath sounds: Normal breath sounds.   Abdominal:      General: Bowel sounds are normal.   Musculoskeletal:         General: Normal range of motion.      Cervical back: Normal range of motion.   Skin:     General: Skin is warm and dry.   Neurological:      Mental Status: He is alert and oriented to person, place, and time.   Psychiatric:    "      Mood and Affect: Mood normal.         Behavior: Behavior normal.         Laboratory Data:  Lab Visit on 05/13/2024   Component Date Value Ref Range Status    MPNR  Specimen type 05/13/2024 blood   Final    MPNR Result 05/13/2024 see interpretation   Final    MPNR  Final Diagnosis: 05/13/2024 SEE BELOW   Final    Erythropoietin 05/13/2024 4.9  2.6 - 18.5 mIU/mL Final   Lab Visit on 05/09/2024   Component Date Value Ref Range Status    Sodium 05/09/2024 143  136 - 145 mmol/L Final    Potassium 05/09/2024 4.3  3.5 - 5.1 mmol/L Final    Chloride 05/09/2024 107  95 - 110 mmol/L Final    CO2 05/09/2024 25  23 - 29 mmol/L Final    Glucose 05/09/2024 110  70 - 110 mg/dL Final    BUN 05/09/2024 18  8 - 23 mg/dL Final    Creatinine 05/09/2024 1.5 (H)  0.5 - 1.4 mg/dL Final    Calcium 05/09/2024 9.6  8.7 - 10.5 mg/dL Final    Total Protein 05/09/2024 7.4  6.0 - 8.4 g/dL Final    Albumin 05/09/2024 4.0  3.5 - 5.2 g/dL Final    Total Bilirubin 05/09/2024 0.7  0.1 - 1.0 mg/dL Final    Alkaline Phosphatase 05/09/2024 64  55 - 135 U/L Final    AST 05/09/2024 25  10 - 40 U/L Final    ALT 05/09/2024 33  10 - 44 U/L Final    eGFR 05/09/2024 50.7 (A)  >60 mL/min/1.73 m^2 Final    Anion Gap 05/09/2024 11  8 - 16 mmol/L Final    WBC 05/09/2024 8.97  3.90 - 12.70 K/uL Final    RBC 05/09/2024 6.31 (H)  4.60 - 6.20 M/uL Final    Hemoglobin 05/09/2024 18.1 (H)  14.0 - 18.0 g/dL Final    Hematocrit 05/09/2024 54.4 (H)  40.0 - 54.0 % Final    MCV 05/09/2024 86  82 - 98 fL Final    MCH 05/09/2024 28.7  27.0 - 31.0 pg Final    MCHC 05/09/2024 33.3  32.0 - 36.0 g/dL Final    RDW 05/09/2024 14.1  11.5 - 14.5 % Final    Platelets 05/09/2024 221  150 - 450 K/uL Final    MPV 05/09/2024 11.3  9.2 - 12.9 fL Final    Immature Granulocytes 05/09/2024 0.2  0.0 - 0.5 % Final    Gran # (ANC) 05/09/2024 5.3  1.8 - 7.7 K/uL Final    Immature Grans (Abs) 05/09/2024 0.02  0.00 - 0.04 K/uL Final    Lymph # 05/09/2024 2.4  1.0 - 4.8 K/uL Final    Mono #  05/09/2024 0.8  0.3 - 1.0 K/uL Final    Eos # 05/09/2024 0.2  0.0 - 0.5 K/uL Final    Baso # 05/09/2024 0.13  0.00 - 0.20 K/uL Final    nRBC 05/09/2024 0  0 /100 WBC Final    Gran % 05/09/2024 59.5  38.0 - 73.0 % Final    Lymph % 05/09/2024 26.9  18.0 - 48.0 % Final    Mono % 05/09/2024 9.3  4.0 - 15.0 % Final    Eosinophil % 05/09/2024 2.7  0.0 - 8.0 % Final    Basophil % 05/09/2024 1.4  0.0 - 1.9 % Final    Differential Method 05/09/2024 Automated   Final          Imaging: Reviewed   Assessment:       1. Polycythemia    2. Sleep apnea, unspecified type           Plan:   JAK2 mutation negative, EPO level wnl.   P vera has been ruled out. We will only phlebotomize if his hematocrit is above 60%   Pt  advised  to have a sleep apnea study. Referral sent.   3.   Follow up in 3 months with repeat cbc      Med Onc Chart Routing      Follow up with physician 3 months. With cbc   Follow up with ARIANNA    Infusion scheduling note    Injection scheduling note    Labs    Imaging    Pharmacy appointment    Other referrals                  Plan was discussed with the patient at length, and he verbalized understanding. Mina was given an opportunity to ask questions that were answered to his satisfaction, and he was advised to call in the interval if any problems or questions arise.    Assessment/Plan reviewed and approved by Dr German    30 minutes were spent in coordination of patient's care, record review and counseling.    Salvatore Levi, STANLEY, FNP-C  Hematology & Oncology

## 2024-06-25 DIAGNOSIS — Z00.00 ENCOUNTER FOR MEDICARE ANNUAL WELLNESS EXAM: ICD-10-CM

## 2024-08-07 ENCOUNTER — PATIENT MESSAGE (OUTPATIENT)
Dept: FAMILY MEDICINE | Facility: CLINIC | Age: 69
End: 2024-08-07
Payer: MEDICARE

## 2024-08-07 DIAGNOSIS — M16.11 PRIMARY OSTEOARTHRITIS OF RIGHT HIP: ICD-10-CM

## 2024-08-07 DIAGNOSIS — Z79.899 ENCOUNTER FOR LONG-TERM (CURRENT) USE OF MEDICATIONS: ICD-10-CM

## 2024-08-07 DIAGNOSIS — Z00.00 ENCOUNTER FOR MEDICAL EXAMINATION TO ESTABLISH CARE: ICD-10-CM

## 2024-08-07 RX ORDER — DICLOFENAC SODIUM 75 MG/1
75 TABLET, DELAYED RELEASE ORAL 2 TIMES DAILY
Qty: 60 TABLET | Refills: 0 | Status: SHIPPED | OUTPATIENT
Start: 2024-08-07

## 2024-08-13 ENCOUNTER — OFFICE VISIT (OUTPATIENT)
Dept: FAMILY MEDICINE | Facility: CLINIC | Age: 69
End: 2024-08-13
Payer: MEDICARE

## 2024-08-13 ENCOUNTER — TELEPHONE (OUTPATIENT)
Dept: SLEEP MEDICINE | Facility: CLINIC | Age: 69
End: 2024-08-13
Payer: MEDICARE

## 2024-08-13 ENCOUNTER — LAB VISIT (OUTPATIENT)
Dept: LAB | Facility: HOSPITAL | Age: 69
End: 2024-08-13
Attending: FAMILY MEDICINE
Payer: MEDICARE

## 2024-08-13 VITALS
DIASTOLIC BLOOD PRESSURE: 88 MMHG | HEART RATE: 75 BPM | BODY MASS INDEX: 29.93 KG/M2 | WEIGHT: 179.63 LBS | HEIGHT: 65 IN | OXYGEN SATURATION: 99 % | SYSTOLIC BLOOD PRESSURE: 136 MMHG

## 2024-08-13 DIAGNOSIS — F51.01 PRIMARY INSOMNIA: ICD-10-CM

## 2024-08-13 DIAGNOSIS — R73.03 PREDIABETES: ICD-10-CM

## 2024-08-13 DIAGNOSIS — E78.2 MIXED HYPERLIPIDEMIA: ICD-10-CM

## 2024-08-13 DIAGNOSIS — N40.1 BPH WITH OBSTRUCTION/LOWER URINARY TRACT SYMPTOMS: ICD-10-CM

## 2024-08-13 DIAGNOSIS — R29.818 SUSPECTED SLEEP APNEA: ICD-10-CM

## 2024-08-13 DIAGNOSIS — N13.8 BPH WITH OBSTRUCTION/LOWER URINARY TRACT SYMPTOMS: ICD-10-CM

## 2024-08-13 DIAGNOSIS — I10 HYPERTENSION, UNSPECIFIED TYPE: Primary | ICD-10-CM

## 2024-08-13 DIAGNOSIS — Z79.899 ENCOUNTER FOR LONG-TERM (CURRENT) USE OF MEDICATIONS: ICD-10-CM

## 2024-08-13 DIAGNOSIS — D75.1 POLYCYTHEMIA: ICD-10-CM

## 2024-08-13 LAB
ALBUMIN SERPL BCP-MCNC: 3.8 G/DL (ref 3.5–5.2)
ALP SERPL-CCNC: 61 U/L (ref 55–135)
ALT SERPL W/O P-5'-P-CCNC: 30 U/L (ref 10–44)
ANION GAP SERPL CALC-SCNC: 9 MMOL/L (ref 8–16)
AST SERPL-CCNC: 25 U/L (ref 10–40)
BILIRUB SERPL-MCNC: 0.7 MG/DL (ref 0.1–1)
BUN SERPL-MCNC: 20 MG/DL (ref 8–23)
CALCIUM SERPL-MCNC: 9.5 MG/DL (ref 8.7–10.5)
CHLORIDE SERPL-SCNC: 107 MMOL/L (ref 95–110)
CHOLEST SERPL-MCNC: 180 MG/DL (ref 120–199)
CHOLEST/HDLC SERPL: 3.8 {RATIO} (ref 2–5)
CO2 SERPL-SCNC: 24 MMOL/L (ref 23–29)
CREAT SERPL-MCNC: 1.1 MG/DL (ref 0.5–1.4)
EST. GFR  (NO RACE VARIABLE): >60 ML/MIN/1.73 M^2
ESTIMATED AVG GLUCOSE: 123 MG/DL (ref 68–131)
GLUCOSE SERPL-MCNC: 86 MG/DL (ref 70–110)
HBA1C MFR BLD: 5.9 % (ref 4–5.6)
HDLC SERPL-MCNC: 47 MG/DL (ref 40–75)
HDLC SERPL: 26.1 % (ref 20–50)
HGB BLD-MCNC: 18.4 G/DL (ref 14–18)
LDLC SERPL CALC-MCNC: 109.2 MG/DL (ref 63–159)
NONHDLC SERPL-MCNC: 133 MG/DL
POTASSIUM SERPL-SCNC: 4.5 MMOL/L (ref 3.5–5.1)
PROT SERPL-MCNC: 7.4 G/DL (ref 6–8.4)
SODIUM SERPL-SCNC: 140 MMOL/L (ref 136–145)
TRIGL SERPL-MCNC: 119 MG/DL (ref 30–150)

## 2024-08-13 PROCEDURE — 1101F PT FALLS ASSESS-DOCD LE1/YR: CPT | Mod: CPTII,S$GLB,, | Performed by: FAMILY MEDICINE

## 2024-08-13 PROCEDURE — 85018 HEMOGLOBIN: CPT | Performed by: FAMILY MEDICINE

## 2024-08-13 PROCEDURE — 3008F BODY MASS INDEX DOCD: CPT | Mod: CPTII,S$GLB,, | Performed by: FAMILY MEDICINE

## 2024-08-13 PROCEDURE — 3079F DIAST BP 80-89 MM HG: CPT | Mod: CPTII,S$GLB,, | Performed by: FAMILY MEDICINE

## 2024-08-13 PROCEDURE — 80061 LIPID PANEL: CPT | Performed by: FAMILY MEDICINE

## 2024-08-13 PROCEDURE — 3075F SYST BP GE 130 - 139MM HG: CPT | Mod: CPTII,S$GLB,, | Performed by: FAMILY MEDICINE

## 2024-08-13 PROCEDURE — 36415 COLL VENOUS BLD VENIPUNCTURE: CPT | Mod: PO | Performed by: FAMILY MEDICINE

## 2024-08-13 PROCEDURE — 83036 HEMOGLOBIN GLYCOSYLATED A1C: CPT | Performed by: FAMILY MEDICINE

## 2024-08-13 PROCEDURE — 1160F RVW MEDS BY RX/DR IN RCRD: CPT | Mod: CPTII,S$GLB,, | Performed by: FAMILY MEDICINE

## 2024-08-13 PROCEDURE — 99214 OFFICE O/P EST MOD 30 MIN: CPT | Mod: S$GLB,,, | Performed by: FAMILY MEDICINE

## 2024-08-13 PROCEDURE — 1126F AMNT PAIN NOTED NONE PRSNT: CPT | Mod: CPTII,S$GLB,, | Performed by: FAMILY MEDICINE

## 2024-08-13 PROCEDURE — 99999 PR PBB SHADOW E&M-EST. PATIENT-LVL IV: CPT | Mod: PBBFAC,,, | Performed by: FAMILY MEDICINE

## 2024-08-13 PROCEDURE — 1159F MED LIST DOCD IN RCRD: CPT | Mod: CPTII,S$GLB,, | Performed by: FAMILY MEDICINE

## 2024-08-13 PROCEDURE — G2211 COMPLEX E/M VISIT ADD ON: HCPCS | Mod: S$GLB,,, | Performed by: FAMILY MEDICINE

## 2024-08-13 PROCEDURE — 3288F FALL RISK ASSESSMENT DOCD: CPT | Mod: CPTII,S$GLB,, | Performed by: FAMILY MEDICINE

## 2024-08-13 PROCEDURE — 80053 COMPREHEN METABOLIC PANEL: CPT | Performed by: FAMILY MEDICINE

## 2024-08-13 PROCEDURE — 3044F HG A1C LEVEL LT 7.0%: CPT | Mod: CPTII,S$GLB,, | Performed by: FAMILY MEDICINE

## 2024-08-13 NOTE — ASSESSMENT & PLAN NOTE
Continue Flomax.  Referral to Urology.  Nighttime awakenings could be due to sleep apnea also.  Discussed risk and benefits of medication use.

## 2024-08-13 NOTE — PROGRESS NOTES
PLAN:    Assessment & Plan  1. Hypertension.  His blood pressure was elevated at 150/96 during the visit. He was advised to monitor his blood pressure at home and aim for readings consistently below 140/90. His blood pressure will be rechecked before he leaves today. If it remains high, consideration will be given to initiating medication. However, he may also opt to continue home monitoring for a few days and provide updates via the portal.    2. Nocturia.  A referral to Dr. Pravin Miles, a urologist in [place], was provided for further evaluation of nocturia. He experiences nocturia 2-3 times a night, which may be related to benign prostatic hyperplasia (BPH).    3. Suspected sleep apnea.  Sleep apnea could potentially contribute to his abnormal blood counts and hypertension. A sleep study was ordered to confirm the diagnosis. He was advised to avoid alcohol and Tylenol PM before bed as these can exacerbate sleep apnea symptoms. Sleeping on an incline using pillows was suggested as a potential aid. A weight loss of 5 to 10 pounds was recommended to improve both sleep apnea and reflux symptoms. He was instructed to follow up if he does not hear from the sleep study team within a week.    4. Hypercholesterolemia.  A repeat cholesterol test was ordered to monitor his response to Lipitor. He is currently taking Lipitor 10 mg daily.    5. Acid reflux.  He was advised to avoid eating late to prevent acid reflux. Weight loss of 5 to 10 pounds was also recommended to help alleviate symptoms.        Problem List Items Addressed This Visit       Encounter for long-term (current) use of medications (Chronic)     Patient following with Hematology for polycythemia.  Likely due to sleep apnea.  Test pending.  Complete history and physical was completed today.  Complete and thorough medication reconciliation was performed.  Discussed risks and benefits of medications.  Advised patient on orders and health maintenance.  We  discussed old records and old labs if available.  Will request any records not available through epic.  Continue current medications listed on your summary sheet.           Hypertension - Primary (Chronic)     Not currently on medication.  Patient likely has sleep apnea per history.  Sleep test is pending.  Patient will monitor blood pressure at home and notify me if greater 140/90.  Consider starting ACE inhibitor if needed.  Counseled on importance of hypertension disease course, I recommend ongoing Education for DASH-diet and exercise.  Counseled on medication regimen importance of treating high blood pressure.  Please be advised of risk of untreated blood pressure as discussed.  Please advised of ER precautions were given for symptoms of hypertensive urgency and emergency.           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  Referral to Urology.  Nighttime awakenings could be due to sleep apnea also.  Discussed risk and benefits of medication use.         Relevant Orders    Ambulatory referral/consult to Urology    Primary insomnia (Chronic)     Set up sleep study.  Follow-up after for results.         Relevant Orders    Home Sleep Study    Mixed hyperlipidemia (Chronic)     Update fasting lipid panel.  Counseled on hyperlipidemia disease course, healthy diet and increased need for exercise.  Please be advised of the risk of cardiovascular disease, increase stroke and heart attack risk with uncontrolled/untreated hyperlipidemia.     Patient voiced understanding and understood the treatment plan. All questions were answered.            Suspected sleep apnea     Elevated stop Bang.  Set up sleep study at home.  Follow-up after for results.         Relevant Orders    Home Sleep Study     Future Appointments       Date Provider Specialty Appt Notes    8/26/2024 Lei Sher NP Family Medicine [facility]    9/13/2024  Lab Labs    9/13/2024 Richard German MD Hematology and  Oncology [facility]           Medication Management for assessment above:   Medication List with Changes/Refills   Current Medications    ATORVASTATIN (LIPITOR) 10 MG TABLET    Take 1 tablet (10 mg total) by mouth once daily.    CYANOCOBALAMIN (VITAMIN B-12) 1000 MCG TABLET        DICLOFENAC (VOLTAREN) 75 MG EC TABLET    TAKE ONE TABLET BY MOUTH TWICE DAILY    FLUTICASONE PROPIONATE (FLONASE ALLERGY RELIEF) 50 MCG/ACTUATION NASAL SPRAY    1 spray in each nostril Nasally Once a day for 30 day(s)    MULTIVITAMIN WITH MINERALS TABLET    Take 1 tablet by mouth once daily.    TAMSULOSIN (FLOMAX) 0.4 MG CAP    Take 1 capsule (0.4 mg total) by mouth once daily.       Juan Manuel Mireles M.D.  ==========================================================================  Subjective:   Patient ID: Mina Viveros . is a 68 y.o. male.  has a past medical history of Allergy, Asthma, and Kidney stone (2000).   Chief Complaint: Follow-up      History of Present Illness  The patient is a 68-year-old male who presents for follow-up of chronic medical conditions.    His blood pressure was initially elevated during the visit. He does not monitor his blood pressure at home, but he has a cuff available for use. He reports feeling well today and has not consumed any caffeine this morning. He is accompanied by an adult female.    He is currently on Flomax for prostate issues, which also aids in urine flow. However, he reports that it doesn't provide significant relief as he still experiences waking up 2 to 3 times per night to urinate.    He has been under the care of Dr. German for a blood-related issue. Despite undergoing various tests, all results were normal. It was suggested that he might have sleep apnea, but no further action was taken. He snores and occasionally wakes up gasping for air. He also struggles with nasal breathing due to sinus issues, which often leads to mouth breathing. He has had sinus problems since  childhood.    He is on Lipitor 80 mg daily for cholesterol management. He is fasting today and wishes to have his cholesterol levels rechecked.    He occasionally experiences acid reflux, particularly after eating, which prompts him to take medication.    Problem List Items Addressed This Visit       Encounter for long-term (current) use of medications (Chronic)    Overview     CHRONIC. Stable. Compliant with medications for managed conditions. See medication list. No SE reported.   Routine lab analysis is being monitored. Refills were addressed.  Lab Results   Component Value Date    WBC 8.97 05/09/2024    HGB 18.1 (H) 05/09/2024    HCT 54.4 (H) 05/09/2024    MCV 86 05/09/2024     05/09/2024         Chemistry        Component Value Date/Time     05/09/2024 1556    K 4.3 05/09/2024 1556     05/09/2024 1556    CO2 25 05/09/2024 1556    BUN 18 05/09/2024 1556    CREATININE 1.5 (H) 05/09/2024 1556     05/09/2024 1556        Component Value Date/Time    CALCIUM 9.6 05/09/2024 1556    ALKPHOS 64 05/09/2024 1556    AST 25 05/09/2024 1556    ALT 33 05/09/2024 1556    BILITOT 0.7 05/09/2024 1556          Lab Results   Component Value Date    TSH 2.142 02/28/2024              Current Assessment & Plan     Patient following with Hematology for polycythemia.  Likely due to sleep apnea.  Test pending.  Complete history and physical was completed today.  Complete and thorough medication reconciliation was performed.  Discussed risks and benefits of medications.  Advised patient on orders and health maintenance.  We discussed old records and old labs if available.  Will request any records not available through epic.  Continue current medications listed on your summary sheet.           Hypertension - Primary (Chronic)    Overview     CHRONIC. STABLE. BP Reviewed.  Compliant with BP medications. No SE reported.   (-) CP, SOB, palpitations, dizziness, lightheadedness, HA, arm numbness, tingling or  weakness, syncope.  Creatinine   Date Value Ref Range Status   05/09/2024 1.5 (H) 0.5 - 1.4 mg/dL Final         No results found for this or any previous visit.           Current Assessment & Plan     Not currently on medication.  Patient likely has sleep apnea per history.  Sleep test is pending.  Patient will monitor blood pressure at home and notify me if greater 140/90.  Consider starting ACE inhibitor if needed.  Counseled on importance of hypertension disease course, I recommend ongoing Education for DASH-diet and exercise.  Counseled on medication regimen importance of treating high blood pressure.  Please be advised of risk of untreated blood pressure as discussed.  Please advised of ER precautions were given for symptoms of hypertensive urgency and emergency.           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  Intermittent control.  Patient on Flomax.  Reports that he is still having several nighttime awakenings.  Does not currently see Urology.  Reports compliance.  No side effects reported.         Current Assessment & Plan     Continue Flomax.  Referral to Urology.  Nighttime awakenings could be due to sleep apnea also.  Discussed risk and benefits of medication use.         Primary insomnia (Chronic)    Overview     Chronic.  Uncontrolled.         Current Assessment & Plan     Set up sleep study.  Follow-up after for results.         Mixed hyperlipidemia (Chronic)    Overview     CHRONIC.  Uncontrolled. Lab analysis reviewed.   Hyperlipidemia Medications               atorvastatin (LIPITOR) 10 MG tablet Take 1 tablet (10 mg total) by mouth once daily.            (-) CP, SOB, abdominal pain, N/V/D, constipation, jaundice, skin changes.  (-) Myalgias  Lab Results   Component Value Date    CHOL 261 (H) 02/28/2024     Lab Results   Component Value Date    HDL 49 02/28/2024     Lab Results   Component Value Date    LDLCALC 171.6 (H) 02/28/2024     Lab Results    Component Value Date    TRIG 202 (H) 02/28/2024     Lab Results   Component Value Date    CHOLHDL 18.8 (L) 02/28/2024     Lab Results   Component Value Date    TOTALCHOLEST 5.3 (H) 02/28/2024     Lab Results   Component Value Date    ALT 33 05/09/2024    AST 25 05/09/2024    ALKPHOS 64 05/09/2024    BILITOT 0.7 05/09/2024     ======================================================  The 10-year ASCVD risk score (Reba SCHNEIDER, et al., 2019) is: 19.2%    Values used to calculate the score:      Age: 68 years      Sex: Male      Is Non- : No      Diabetic: No      Tobacco smoker: No      Systolic Blood Pressure: 136 mmHg      Is BP treated: No      HDL Cholesterol: 49 mg/dL      Total Cholesterol: 261 mg/dL           Current Assessment & Plan     Update fasting lipid panel.  Counseled on hyperlipidemia disease course, healthy diet and increased need for exercise.  Please be advised of the risk of cardiovascular disease, increase stroke and heart attack risk with uncontrolled/untreated hyperlipidemia.     Patient voiced understanding and understood the treatment plan. All questions were answered.            Suspected sleep apnea    Overview     Patient with high risk for sleep apnea including elevated blood pressures, daytime fatigue, snoring, overweight.         Current Assessment & Plan     Elevated stop Bang.  Set up sleep study at home.  Follow-up after for results.             Review of patient's allergies indicates:  No Known Allergies  Current Outpatient Medications   Medication Instructions    atorvastatin (LIPITOR) 10 mg, Oral, Daily    cyanocobalamin (VITAMIN B-12) 1000 MCG tablet     diclofenac (VOLTAREN) 75 mg, Oral, 2 times daily    fluticasone propionate (FLONASE ALLERGY RELIEF) 50 mcg/actuation nasal spray 1 spray in each nostril Nasally Once a day for 30 day(s)    multivitamin with minerals tablet 1 tablet, Daily    tamsulosin (FLOMAX) 0.4 mg, Oral, Daily      I have reviewed the  "PMH, social history, FamilyHx, surgical history, allergies and medications documented / confirmed by the patient at the time of this visit.  Review of Systems   Constitutional:  Negative for activity change and unexpected weight change.   HENT:  Negative for hearing loss, rhinorrhea and trouble swallowing.    Eyes:  Negative for discharge and visual disturbance.   Respiratory:  Negative for chest tightness and wheezing.    Cardiovascular:  Negative for chest pain and palpitations.   Gastrointestinal:  Negative for blood in stool, constipation, diarrhea and vomiting.   Endocrine: Negative for polydipsia and polyuria.   Genitourinary:  Negative for difficulty urinating, hematuria and urgency.   Musculoskeletal:  Negative for arthralgias, joint swelling and neck pain.   Neurological:  Negative for weakness and headaches.   Psychiatric/Behavioral:  Positive for sleep disturbance. Negative for confusion and dysphoric mood.      Objective:   /88   Pulse 75   Ht 5' 5" (1.651 m)   Wt 81.5 kg (179 lb 9.6 oz)   SpO2 99%   BMI 29.89 kg/m²   Physical Exam  Vitals and nursing note reviewed.   Constitutional:       General: He is not in acute distress.     Appearance: He is well-developed. He is not diaphoretic.   HENT:      Head: Normocephalic and atraumatic.      Right Ear: External ear normal.      Left Ear: External ear normal.      Nose: Nose normal. No rhinorrhea.   Eyes:      Extraocular Movements: Extraocular movements intact.      Pupils: Pupils are equal, round, and reactive to light.   Cardiovascular:      Rate and Rhythm: Normal rate.      Pulses: Normal pulses.   Pulmonary:      Effort: Pulmonary effort is normal. No respiratory distress.      Breath sounds: Normal breath sounds.   Abdominal:      General: Bowel sounds are normal.      Palpations: Abdomen is soft.   Genitourinary:     Comments:  deferred  Musculoskeletal:         General: Normal range of motion.      Cervical back: Normal range of motion " and neck supple.   Skin:     General: Skin is warm and dry.      Capillary Refill: Capillary refill takes less than 2 seconds.      Findings: No rash.   Neurological:      General: No focal deficit present.      Mental Status: He is alert and oriented to person, place, and time.      Cranial Nerves: No cranial nerve deficit.      Motor: No weakness.      Gait: Gait normal.   Psychiatric:         Attention and Perception: He is attentive.         Mood and Affect: Mood normal. Mood is not anxious or depressed. Affect is not labile, blunt, angry or inappropriate.         Speech: He is communicative. Speech is not rapid and pressured, delayed, slurred or tangential.         Behavior: Behavior normal. Behavior is not agitated, slowed, aggressive, withdrawn, hyperactive or combative.         Thought Content: Thought content normal. Thought content is not paranoid or delusional. Thought content does not include homicidal or suicidal ideation. Thought content does not include homicidal or suicidal plan.         Cognition and Memory: Memory is not impaired.         Judgment: Judgment normal. Judgment is not impulsive or inappropriate.       Physical Exam  Ears are slightly red, but no wax is present.    Vital Signs  Blood pressure reading is 150/96.    Results      Assessment:     1. Hypertension, unspecified type    2. Encounter for long-term (current) use of medications    3. BPH with obstruction/lower urinary tract symptoms    4. Suspected sleep apnea    5. Primary insomnia    6. Mixed hyperlipidemia      MDM:   Moderate medical complexity.  Moderate risk.  Total time: 21 minutes.  This includes total time spent on the encounter, which includes face to face time and non-face to face time preparing to see the patient (eg, review of previous medical records, tests), Obtaining and/or reviewing separately obtained history, documenting clinical information in the electronic or other health record, independently interpreting  results (not separately reported)/communicating results to the patient/family/caregiver, and/or care coordination (not separately reported).    I have Reviewed and summarized old records.  I have performed thorough medication reconciliation today and discussed risk and benefits of medications.  I have reviewed labs and discussed with patient.  All questions were answered.  I am requesting old records and will review them once they are available.  Hematology  Visit today included increased complexity associated with the care of the episodic problem see above assessment addressed and managing the longitudinal care of the patient due to the serious and/or complex managed problem(s) see above.  I have signed for the following orders AND/OR meds.  Orders Placed This Encounter   Procedures    Ambulatory referral/consult to Urology     Standing Status:   Future     Standing Expiration Date:   9/13/2025     Referral Priority:   Routine     Referral Type:   Consultation     Referral Reason:   Specialty Services Required     Referred to Provider:   Pravin Miles MD     Requested Specialty:   Urology     Number of Visits Requested:   1    Home Sleep Study     Standing Status:   Future     Standing Expiration Date:   8/13/2025           Follow up in about 6 months (around 2/13/2025), or if symptoms worsen or fail to improve, for Med refills, LAB RESULTS, 2 weeks for BP check.  Future Appointments       Date Provider Specialty Appt Notes    8/26/2024 Lei Sher NP Family Medicine [facility]    9/13/2024  Lab Labs    9/13/2024 Richard German MD Hematology and Oncology [facility]          If no improvement in symptoms or symptoms worsen, advised to call/follow-up at clinic or go to ER. Patient voiced understanding and all questions/concerns were addressed.   DISCLAIMER: This note was compiled by using a speech recognition dictation system and therefore please be aware that typographical / speech recognition errors can and do  occur.  Please contact me if you see any errors specifically.  Consent was obtained for MAR[employer] recording system prior to the visit.    Juan Manuel Mireles M.D.       Office: [phone] [address] [place] [address]  FAX: [phone]

## 2024-08-13 NOTE — PATIENT INSTRUCTIONS
Follow up in about 6 months (around 2/13/2025), or if symptoms worsen or fail to improve, for Med refills, LAB RESULTS, 2 weeks for BP check.     Dear patient,   As a result of recent federal legislation (The Federal Cures Act), you may receive lab or pathology results from your visit in your [facility] account before your physician is able to contact you. Your physician or their representative will relay the results to you with their recommendations at their soonest availability.     If no improvement in symptoms or symptoms worsen, please be advised to call MD, follow-up at clinic and/or go to ER if becomes severe.    Juan Manuel Mireles M.D.        We Offer TELEHEALTH & Same Day Appointments!   Book your Telehealth appointment with me through my nurse or   Clinic appointments on [employer]!    [address] [place] [address]    Office: [phone]   FAX: [phone]    Check out my Facebook Page and Follow Me at: [email or web address]    Check out my website at [employer] [email or web address] clicking on: [email or web address]    To Schedule appointments online, go to [employer]: [email or web address]

## 2024-08-13 NOTE — ASSESSMENT & PLAN NOTE
Patient following with Hematology for polycythemia.  Likely due to sleep apnea.  Test pending.  Complete history and physical was completed today.  Complete and thorough medication reconciliation was performed.  Discussed risks and benefits of medications.  Advised patient on orders and health maintenance.  We discussed old records and old labs if available.  Will request any records not available through epic.  Continue current medications listed on your summary sheet.

## 2024-08-13 NOTE — ASSESSMENT & PLAN NOTE
Update fasting lipid panel.  Counseled on hyperlipidemia disease course, healthy diet and increased need for exercise.  Please be advised of the risk of cardiovascular disease, increase stroke and heart attack risk with uncontrolled/untreated hyperlipidemia.     Patient voiced understanding and understood the treatment plan. All questions were answered.

## 2024-08-13 NOTE — ASSESSMENT & PLAN NOTE
Not currently on medication.  Patient likely has sleep apnea per history.  Sleep test is pending.  Patient will monitor blood pressure at home and notify me if greater 140/90.  Consider starting ACE inhibitor if needed.  Counseled on importance of hypertension disease course, I recommend ongoing Education for DASH-diet and exercise.  Counseled on medication regimen importance of treating high blood pressure.  Please be advised of risk of untreated blood pressure as discussed.  Please advised of ER precautions were given for symptoms of hypertensive urgency and emergency.

## 2024-08-14 NOTE — PROGRESS NOTES
Make follow-up lab appointment per recommendation below.  Check to see if patient has seen the results through my chart.  If not then,  #CALL THE PATIENT# to discuss results/see if they have questions and document verification of contact. Make F/U appt if needed. [phone]    #My interpretation that was sent to them through [employer]:  Mina, I have reviewed your recent blood work.     Your hemoglobin is stable.  Please complete home sleep study that was ordered and follow-up after for results.  Follow-up with Hematology.  Your metabolic panel which shows your glucose, kidney function, electrolytes, and liver function is normal.   Your cholesterol is improved significantly.  Keep up the great work and continue current medication.  Your hemoglobin A1c is slightly elevated from previous.  I recommend low-carbohydrate diet and increase in exercise.  This test is gold standard screening test for diabetes.  It is a measures 3 months of your average blood sugar.    =========================  Also please address any outstanding health maintenance that may be due: TETANUS VACCINE Never done  Colorectal Cancer Screening Never done  RSV Vaccine (Age 60+ and Pregnant patients)(1 - 1-dose 60+ series) Never done 60 year old male with history of CAD, Severely Dilated CM/HFrEF s/p ICD, Substance abuse. Atrial Flutter on Eliquis, ETOH use who presented to the ED with complaints of right arm and foot pain with increased shortness of breath due to medication non-compliance and admitted with decompensated heart failure. Pt reported good po intake PTA and currently. Stated wt usually fluctuates from 150-160 lbs, current wt 165 lbs. Eductaed on importance of HBV protein and a heart healthy diet. Pt eats many processed/convenient foods, provided healthier options. Pt verbalized understanding. Last documented BM 12/25.

## 2024-09-09 ENCOUNTER — PATIENT MESSAGE (OUTPATIENT)
Dept: ADMINISTRATIVE | Facility: HOSPITAL | Age: 69
End: 2024-09-09
Payer: MEDICARE

## 2024-09-10 ENCOUNTER — PATIENT MESSAGE (OUTPATIENT)
Dept: HEMATOLOGY/ONCOLOGY | Facility: CLINIC | Age: 69
End: 2024-09-10
Payer: MEDICARE

## 2024-09-10 DIAGNOSIS — Z12.11 SCREENING FOR COLON CANCER: ICD-10-CM

## 2024-09-11 ENCOUNTER — PATIENT MESSAGE (OUTPATIENT)
Dept: FAMILY MEDICINE | Facility: CLINIC | Age: 69
End: 2024-09-11
Payer: MEDICARE

## 2024-09-13 ENCOUNTER — OFFICE VISIT (OUTPATIENT)
Dept: HEMATOLOGY/ONCOLOGY | Facility: CLINIC | Age: 69
End: 2024-09-13
Payer: MEDICARE

## 2024-09-13 ENCOUNTER — LAB VISIT (OUTPATIENT)
Dept: LAB | Facility: HOSPITAL | Age: 69
End: 2024-09-13
Attending: FAMILY MEDICINE
Payer: MEDICARE

## 2024-09-13 VITALS
SYSTOLIC BLOOD PRESSURE: 103 MMHG | WEIGHT: 175.94 LBS | BODY MASS INDEX: 29.31 KG/M2 | HEIGHT: 65 IN | DIASTOLIC BLOOD PRESSURE: 77 MMHG | HEART RATE: 65 BPM

## 2024-09-13 DIAGNOSIS — D75.1 POLYCYTHEMIA: Primary | ICD-10-CM

## 2024-09-13 DIAGNOSIS — D75.1 POLYCYTHEMIA: ICD-10-CM

## 2024-09-13 LAB
BASOPHILS # BLD AUTO: 0.11 K/UL (ref 0–0.2)
BASOPHILS NFR BLD: 1.4 % (ref 0–1.9)
DIFFERENTIAL METHOD BLD: ABNORMAL
EOSINOPHIL # BLD AUTO: 0.2 K/UL (ref 0–0.5)
EOSINOPHIL NFR BLD: 2.1 % (ref 0–8)
ERYTHROCYTE [DISTWIDTH] IN BLOOD BY AUTOMATED COUNT: 13.8 % (ref 11.5–14.5)
HCT VFR BLD AUTO: 55.2 % (ref 40–54)
HGB BLD-MCNC: 18.4 G/DL (ref 14–18)
IMM GRANULOCYTES # BLD AUTO: 0.01 K/UL (ref 0–0.04)
IMM GRANULOCYTES NFR BLD AUTO: 0.1 % (ref 0–0.5)
LYMPHOCYTES # BLD AUTO: 2.3 K/UL (ref 1–4.8)
LYMPHOCYTES NFR BLD: 28.3 % (ref 18–48)
MCH RBC QN AUTO: 28.7 PG (ref 27–31)
MCHC RBC AUTO-ENTMCNC: 33.3 G/DL (ref 32–36)
MCV RBC AUTO: 86 FL (ref 82–98)
MONOCYTES # BLD AUTO: 0.8 K/UL (ref 0.3–1)
MONOCYTES NFR BLD: 9.7 % (ref 4–15)
NEUTROPHILS # BLD AUTO: 4.7 K/UL (ref 1.8–7.7)
NEUTROPHILS NFR BLD: 58.4 % (ref 38–73)
NRBC BLD-RTO: 0 /100 WBC
PLATELET # BLD AUTO: 211 K/UL (ref 150–450)
PMV BLD AUTO: 11.7 FL (ref 9.2–12.9)
RBC # BLD AUTO: 6.42 M/UL (ref 4.6–6.2)
WBC # BLD AUTO: 8.05 K/UL (ref 3.9–12.7)

## 2024-09-13 PROCEDURE — 85025 COMPLETE CBC W/AUTO DIFF WBC: CPT | Mod: PN

## 2024-09-13 PROCEDURE — 99999 PR PBB SHADOW E&M-EST. PATIENT-LVL III: CPT | Mod: PBBFAC,,, | Performed by: INTERNAL MEDICINE

## 2024-09-13 PROCEDURE — 36415 COLL VENOUS BLD VENIPUNCTURE: CPT | Mod: PN
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
## 2024-10-03 DIAGNOSIS — Z79.899 ENCOUNTER FOR LONG-TERM (CURRENT) USE OF MEDICATIONS: ICD-10-CM

## 2024-10-03 DIAGNOSIS — N13.8 BPH WITH OBSTRUCTION/LOWER URINARY TRACT SYMPTOMS: ICD-10-CM

## 2024-10-03 DIAGNOSIS — N40.1 BPH WITH OBSTRUCTION/LOWER URINARY TRACT SYMPTOMS: ICD-10-CM

## 2024-10-03 DIAGNOSIS — E78.2 MIXED HYPERLIPIDEMIA: ICD-10-CM

## 2024-10-03 RX ORDER — ATORVASTATIN CALCIUM 10 MG/1
10 TABLET, FILM COATED ORAL
Qty: 90 TABLET | Refills: 3 | Status: SHIPPED | OUTPATIENT
Start: 2024-10-03

## 2024-10-03 RX ORDER — TAMSULOSIN HYDROCHLORIDE 0.4 MG/1
1 CAPSULE ORAL
Qty: 90 CAPSULE | Refills: 3 | Status: SHIPPED | OUTPATIENT
Start: 2024-10-03

## 2024-10-03 NOTE — TELEPHONE ENCOUNTER
Refill Decision Note   Mina Viveros  is requesting a refill authorization.  Brief Assessment and Rationale for Refill:  Approve     Medication Therapy Plan:         Comments:     Note composed:5:09 PM 10/03/2024

## 2024-10-03 NOTE — TELEPHONE ENCOUNTER
No care due was identified.  Health [facility] Embedded Care Due Messages. Reference number: [id number].   10/03/2024 12:07:25 PM CDT

## 2024-10-25 ENCOUNTER — HOSPITAL ENCOUNTER (OUTPATIENT)
Dept: SLEEP MEDICINE | Facility: HOSPITAL | Age: 69
Discharge: HOME | End: 2024-10-25
Attending: FAMILY MEDICINE
Payer: MEDICARE

## 2024-10-25 DIAGNOSIS — G47.33 OSA (OBSTRUCTIVE SLEEP APNEA): Primary | ICD-10-CM

## 2024-10-25 DIAGNOSIS — R29.818 SUSPECTED SLEEP APNEA: ICD-10-CM

## 2024-10-25 DIAGNOSIS — F51.01 PRIMARY INSOMNIA: ICD-10-CM

## 2024-11-04 ENCOUNTER — PATIENT MESSAGE (OUTPATIENT)
Dept: PULMONOLOGY | Facility: CLINIC | Age: 69
End: 2024-11-04
Payer: MEDICARE

## 2024-11-06 ENCOUNTER — OFFICE VISIT (OUTPATIENT)
Dept: FAMILY MEDICINE | Facility: CLINIC | Age: 69
End: 2024-11-06
Payer: MEDICARE

## 2024-11-06 VITALS
DIASTOLIC BLOOD PRESSURE: 87 MMHG | BODY MASS INDEX: 28.94 KG/M2 | HEIGHT: 65 IN | HEART RATE: 66 BPM | WEIGHT: 173.69 LBS | SYSTOLIC BLOOD PRESSURE: 138 MMHG | OXYGEN SATURATION: 97 %

## 2024-11-06 DIAGNOSIS — Z23 NEED FOR VACCINATION: ICD-10-CM

## 2024-11-06 DIAGNOSIS — G47.33 MODERATE OBSTRUCTIVE SLEEP APNEA: Primary | ICD-10-CM

## 2024-11-06 DIAGNOSIS — K14.8 ENLARGED TONGUE: ICD-10-CM

## 2024-11-06 DIAGNOSIS — F51.01 PRIMARY INSOMNIA: Chronic | ICD-10-CM

## 2024-11-06 DIAGNOSIS — Z79.899 ENCOUNTER FOR LONG-TERM (CURRENT) USE OF MEDICATIONS: ICD-10-CM

## 2024-11-06 DIAGNOSIS — I10 HYPERTENSION, UNSPECIFIED TYPE: Chronic | ICD-10-CM

## 2024-11-06 PROCEDURE — 99214 OFFICE O/P EST MOD 30 MIN: CPT | Mod: 25,S$GLB,, | Performed by: FAMILY MEDICINE

## 2024-11-06 PROCEDURE — 3044F HG A1C LEVEL LT 7.0%: CPT | Mod: CPTII,S$GLB,, | Performed by: FAMILY MEDICINE

## 2024-11-06 PROCEDURE — 3288F FALL RISK ASSESSMENT DOCD: CPT | Mod: CPTII,S$GLB,, | Performed by: FAMILY MEDICINE

## 2024-11-06 PROCEDURE — 90653 IIV ADJUVANT VACCINE IM: CPT | Mod: S$GLB,,, | Performed by: FAMILY MEDICINE

## 2024-11-06 PROCEDURE — 1126F AMNT PAIN NOTED NONE PRSNT: CPT | Mod: CPTII,S$GLB,, | Performed by: FAMILY MEDICINE

## 2024-11-06 PROCEDURE — 3008F BODY MASS INDEX DOCD: CPT | Mod: CPTII,S$GLB,, | Performed by: FAMILY MEDICINE

## 2024-11-06 PROCEDURE — G0008 ADMIN INFLUENZA VIRUS VAC: HCPCS | Mod: S$GLB,,, | Performed by: FAMILY MEDICINE

## 2024-11-06 PROCEDURE — 1101F PT FALLS ASSESS-DOCD LE1/YR: CPT | Mod: CPTII,S$GLB,, | Performed by: FAMILY MEDICINE

## 2024-11-06 PROCEDURE — 1159F MED LIST DOCD IN RCRD: CPT | Mod: CPTII,S$GLB,, | Performed by: FAMILY MEDICINE

## 2024-11-06 PROCEDURE — 99999 PR PBB SHADOW E&M-EST. PATIENT-LVL V: CPT | Mod: PBBFAC,,, | Performed by: FAMILY MEDICINE

## 2024-11-06 PROCEDURE — 3079F DIAST BP 80-89 MM HG: CPT | Mod: CPTII,S$GLB,, | Performed by: FAMILY MEDICINE

## 2024-11-06 PROCEDURE — 3075F SYST BP GE 130 - 139MM HG: CPT | Mod: CPTII,S$GLB,, | Performed by: FAMILY MEDICINE

## 2024-11-06 NOTE — PATIENT INSTRUCTIONS
[place],     If you are due for any health screening(s) below please notify me so we can arrange them to be ordered and scheduled. Most healthy patients at your age complete them, but you are free to accept or refuse.     If you can't do it, I'll definitely understand. If you can, I'd certainly appreciate it!    Tests to Keep You Healthy    Colon Cancer Screening: ORDERED  Last Blood Pressure <= 139/89 (11/6/2024): Yes      Its time for your colon cancer screening     Colorectal cancer is one of the leading causes of cancer death for men and women but it doesnt have to be. Screenings can prevent colorectal cancer or find it early enough to treat and cure the disease.     Our records indicate that you may be overdue for colon cancer screening. A colonoscopy or stool screening test can help identify patients at risk for developing colon cancer. Cancer screenings save lives, so schedule yours today to stay healthy.     A colonoscopy is the preferred test for detecting colon cancer. It is needed only once every 10 years if results are negative. While you are sedated, a flexible, lighted tube with a tiny camera is inserted into the rectum and advanced through the colon to look for cancers.     An alternative screening test that is used at home and returned to the lab may also be used. It detects hidden blood in bowel movements which could indicate cancer in the colon. If results are positive, you will need a colonoscopy to determine if the blood is a sign of cancer. This type of follow up (diagnostic) colonoscopy usually requires additional copays as required by your insurance provider.     If you recently had your colon cancer screening performed outside of [facility], please let your Health care team know so that they can update your health record. Please contact your PCP if you have any questions.

## 2024-11-06 NOTE — PROGRESS NOTES
PLAN:      Assessment & Plan  1. Sleep Apnea.  His blood pressure was elevated during the last visit in August 2023. The possibility of sleep apnea contributing to this was discussed. His sleep study revealed moderate sleep-disordered breathing with 15 episodes, indicating periods of non-breathing during the night. He spent 14 percent of the night sleeping on his back, which increases the likelihood of sleep apnea due to gravity causing the tongue and soft tissues to collapse the airway. His oxygen level dropped to 91 percent, which is suboptimal. Snoring was present for 23 percent of the study, with 17 percent being very loud. His average pulse rate was 67. The recommendation is for an in-lab sleep study to determine the exact CPAP setting needed for his body. Weight loss of 5 to 10 pounds and side sleeping could potentially alleviate the issue. A referral to sleep disorders at [facility] will be made.    2. Elevated Blood Pressure.  His blood pressure is borderline at 138/87. It is likely related to his sleep apnea. Improvement in sleep quality and weight loss may help in managing his blood pressure.    3. Health Maintenance.  An influenza vaccine will be administered today. He will obtain the Tdap vaccine from the pharmacy.        Problem List Items Addressed This Visit       Encounter for long-term (current) use of medications (Chronic)     Patient following with Hematology for polycythemia.  Likely due to sleep apnea.  Test confirmed.  Complete history and physical was completed today.  Complete and thorough medication reconciliation was performed.  Discussed risks and benefits of medications.  Advised patient on orders and health maintenance.  We discussed old records and old labs if available.  Will request any records not available through epic.  Continue current medications listed on your summary sheet.           Hypertension (Chronic)     Not currently on medication.  Patient likely has sleep apnea per  history.  Sleep test is pending.  Patient will monitor blood pressure at home and notify me if greater 140/90.  Consider starting ACE inhibitor if needed.  Counseled on importance of hypertension disease course, I recommend ongoing Education for DASH-diet and exercise.  Counseled on medication regimen importance of treating high blood pressure.  Please be advised of risk of untreated blood pressure as discussed.  Please advised of ER precautions were given for symptoms of hypertensive urgency and emergency.           Primary insomnia (Chronic)     Referral to sleep Medicine for further evaluation.  Avoid sleeping supine.         Moderate obstructive sleep apnea - Primary     Referral to sleep Medicine for further evaluation and treatment.         Relevant Orders    Ambulatory referral/consult to Sleep Disorders    Enlarged tongue     Other Visit Diagnoses       Need for vaccination        Relevant Medications    influenza (adjuvanted) (Fluad) 45 mcg/0.5 mL IM vaccine (> or = 66 yo) 0.5 mL (Completed)          Future Appointments       Date Provider Specialty Appt Notes    11/22/2024  Lab Dr. Bravo    11/22/2024 Richard German MD Hematology and Oncology follow up w/ labs    2/19/2025 Lejeune, Elizabeth B, NP Sleep Medicine Moderate obstructive sleep apnea [G47.33]           Medication Management for assessment above:   Medication List with Changes/Refills   Current Medications    ATORVASTATIN (LIPITOR) 10 MG TABLET    TAKE ONE TABLET BY MOUTH ONCE DAILY    CYANOCOBALAMIN (VITAMIN B-12) 1000 MCG TABLET        DICLOFENAC (VOLTAREN) 75 MG EC TABLET    TAKE ONE TABLET BY MOUTH TWICE DAILY    FLUTICASONE PROPIONATE (FLONASE ALLERGY RELIEF) 50 MCG/ACTUATION NASAL SPRAY    1 spray in each nostril Nasally Once a day for 30 day(s)    MULTIVITAMIN WITH MINERALS TABLET    Take 1 tablet by mouth once daily.    TAMSULOSIN (FLOMAX) 0.4 MG CAP    TAKE ONE CAPSULE BY MOUTH ONCE DAILY       Juan Manuel Mireles,  M.D.  ==========================================================================  Subjective:   Patient ID: Mina Viveros Sr. is a 69 y.o. male.  has a past medical history of Allergy, Asthma, and Kidney stone (2000).   Chief Complaint: Results      History of Present Illness  The patient is a 69-year-old male who presents for review of sleep study results. He is accompanied by his wife.    He reports feeling well overall. He has undergone a home sleep study and is interested in receiving both the influenza and tetanus vaccines today. He has been informed that he has a thick tongue. Despite his fondness for food, he maintains an active lifestyle, engaging in activities such as grass cutting.    Problem List Items Addressed This Visit       Encounter for long-term (current) use of medications (Chronic)    Overview     November 2024:  Reviewed labs.  CHRONIC. Stable. Compliant with medications for managed conditions. See medication list. No SE reported.   Routine lab analysis is being monitored. Refills were addressed.  Lab Results   Component Value Date    WBC 8.05 09/13/2024    HGB 18.4 (H) 09/13/2024    HCT 55.2 (H) 09/13/2024    MCV 86 09/13/2024     09/13/2024         Chemistry        Component Value Date/Time     08/13/2024 1036    K 4.5 08/13/2024 1036     08/13/2024 1036    CO2 24 08/13/2024 1036    BUN 20 08/13/2024 1036    CREATININE 1.1 08/13/2024 1036    GLU 86 08/13/2024 1036        Component Value Date/Time    CALCIUM 9.5 08/13/2024 1036    ALKPHOS 61 08/13/2024 1036    AST 25 08/13/2024 1036    ALT 30 08/13/2024 1036    BILITOT 0.7 08/13/2024 1036          Lab Results   Component Value Date    TSH 2.142 02/28/2024              Current Assessment & Plan     Patient following with Hematology for polycythemia.  Likely due to sleep apnea.  Test confirmed.  Complete history and physical was completed today.  Complete and thorough medication reconciliation was performed.  Discussed risks  and benefits of medications.  Advised patient on orders and health maintenance.  We discussed old records and old labs if available.  Will request any records not available through epic.  Continue current medications listed on your summary sheet.           Hypertension (Chronic)    Overview     CHRONIC. STABLE. BP Reviewed.  Compliant with BP medications. No SE reported. November 2024:      (-) CP, SOB, palpitations, dizziness, lightheadedness, HA, arm numbness, tingling or weakness, syncope.  Creatinine   Date Value Ref Range Status   08/13/2024 1.1 0.5 - 1.4 mg/dL Final         No results found for this or any previous visit.           Current Assessment & Plan     Not currently on medication.  Patient likely has sleep apnea per history.  Sleep test is pending.  Patient will monitor blood pressure at home and notify me if greater 140/90.  Consider starting ACE inhibitor if needed.  Counseled on importance of hypertension disease course, I recommend ongoing Education for DASH-diet and exercise.  Counseled on medication regimen importance of treating high blood pressure.  Please be advised of risk of untreated blood pressure as discussed.  Please advised of ER precautions were given for symptoms of hypertensive urgency and emergency.           Primary insomnia (Chronic)    Overview     Chronic.  Uncontrolled.  Patient with home sleep study showing sleep apnea.         Current Assessment & Plan     Referral to sleep Medicine for further evaluation.  Avoid sleeping supine.         Moderate obstructive sleep apnea - Primary    Overview     PHYSICIAN INTERPRETATION AND COMMENTS: Findings are consistent with moderate, positional obstructive sleep  apnea(BAYLEE), with indications of respiratory control instability. Please refer to sleep disorders clinic  CLINICAL HISTORY: 68 year old male presented with: 16 inch neck, BMI of 29, an [place] sleepiness score of 6, no comorbidities  and symptoms of nocturnal snoring, waking up  choking and witnessed apneas. Based on the clinical history,  the patient has a high pre-test probability of having Severe BAYLEE.  SLEEP STUDY FINDINGS: Patient underwent a 1 night Home Sleep Test and by behavioral criteria, slept for approximately  6.77 hours , with a sleep efficiency of 97%. Moderate sleep disordered breathing (AHI=15) is noted based on a 4% hypopnea  desaturation criteria, predominantly in the supine position (64 events/hour) and with indications of respiratory control  instability. The patient slept supine 14.6% of the night based on valid recording time of 6.78 hours and is 10.7 times as  likely to have apneas/hypopneas when supine. When considering more subtle measures of sleep disordered breathing, the  overall respiratory disturbance index is moderate(RDI=22) based on a 1% hypopnea desaturation criteria with  confirmation by surrogate arousal indicators. The apneas/hypopneas are accompanied by minimal oxygen desaturation  (percent time below 90% SpO2: 0%, Min SpO2: 91.2%). The average desaturation across all sleep disordered breathing  events is 2.1%. Snoring occurs for 23.2% (30 dB) of the study, 17.1% is very loud. The mean pulse rate is 67 BPM, with very  frequent pulse rate variability (71 events with >= 6 BPM increase/decrease per hour).  TREATMENT CONSIDERATIONS: Consider an attended CPAP titration study. Presence of respiratory control instability  should be evaluated by a sleep specialist prior to initiating positive pressure treatment. Consider nasal continuous  positive airway pressure based on the AHI severity. The patient should avoid sleeping supine; the non-supine AHI is 10.7  times less severe than the supine AHI.  DISEASE MANAGEMENT CONSIDERATIONS: None.               Current Assessment & Plan     Referral to sleep Medicine for further evaluation and treatment.         Enlarged tongue    Overview     Patient with sleep apnea          Other Visit Diagnoses       Need for  "vaccination                 Review of patient's allergies indicates:  No Known Allergies  Current Outpatient Medications   Medication Instructions    atorvastatin (LIPITOR) 10 mg, Oral    cyanocobalamin (VITAMIN B-12) 1000 MCG tablet     diclofenac (VOLTAREN) 75 mg, Oral, 2 times daily    fluticasone propionate (FLONASE ALLERGY RELIEF) 50 mcg/actuation nasal spray 1 spray in each nostril Nasally Once a day for 30 day(s)    multivitamin with minerals tablet 1 tablet, Daily    tamsulosin (FLOMAX) 0.4 mg, Oral      I have reviewed the PMH, social history, FamilyHx, surgical history, allergies and medications documented / confirmed by the patient at the time of this visit.  Review of Systems   Constitutional:  Negative for activity change and unexpected weight change.   HENT:  Negative for hearing loss, rhinorrhea and trouble swallowing.    Eyes:  Negative for discharge and visual disturbance.   Respiratory:  Negative for chest tightness and wheezing.    Cardiovascular:  Negative for chest pain and palpitations.   Gastrointestinal:  Negative for blood in stool, constipation, diarrhea and vomiting.   Endocrine: Negative for polydipsia and polyuria.   Genitourinary:  Negative for difficulty urinating, hematuria and urgency.   Musculoskeletal:  Negative for arthralgias, joint swelling and neck pain.   Neurological:  Negative for weakness and headaches.   Psychiatric/Behavioral:  Positive for sleep disturbance. Negative for confusion and dysphoric mood.      Objective:   /87   Pulse 66   Ht 5' 5" (1.651 m)   Wt 78.8 kg (173 lb 11.2 oz)   SpO2 97%   BMI 28.91 kg/m²   Physical Exam  Vitals and nursing note reviewed.   Constitutional:       General: He is not in acute distress.     Appearance: He is well-developed. He is not diaphoretic.   HENT:      Head: Normocephalic and atraumatic.      Right Ear: External ear normal.      Left Ear: External ear normal.      Nose: Nose normal. No rhinorrhea.      Mouth/Throat: "      Comments: Narrow airway, enlarged tongue  Eyes:      Extraocular Movements: Extraocular movements intact.      Pupils: Pupils are equal, round, and reactive to light.   Neck:      Comments: Enlarged neck circumference  Cardiovascular:      Rate and Rhythm: Normal rate.      Pulses: Normal pulses.   Pulmonary:      Effort: Pulmonary effort is normal. No respiratory distress.      Breath sounds: Normal breath sounds.   Abdominal:      General: Bowel sounds are normal.      Palpations: Abdomen is soft.   Musculoskeletal:         General: Normal range of motion.      Cervical back: Normal range of motion and neck supple.   Skin:     General: Skin is warm and dry.      Capillary Refill: Capillary refill takes less than 2 seconds.      Findings: No rash.   Neurological:      General: No focal deficit present.      Mental Status: He is alert and oriented to person, place, and time.      Cranial Nerves: No cranial nerve deficit.      Motor: No weakness.      Gait: Gait normal.   Psychiatric:         Attention and Perception: He is attentive.         Mood and Affect: Mood normal. Mood is not anxious or depressed. Affect is not labile, blunt, angry or inappropriate.         Speech: He is communicative. Speech is not rapid and pressured, delayed, slurred or tangential.         Behavior: Behavior normal. Behavior is not agitated, slowed, aggressive, withdrawn, hyperactive or combative.         Thought Content: Thought content normal. Thought content is not paranoid or delusional. Thought content does not include homicidal or suicidal ideation. Thought content does not include homicidal or suicidal plan.         Cognition and Memory: Memory is not impaired.         Judgment: Judgment normal. Judgment is not impulsive or inappropriate.       Physical Exam  Vital Signs  Blood pressure is borderline at 138/87.    Results  Testing  Home sleep study: Moderate sleep disordered breathing with 15 episodes, oxygen dropped down to  91%, snoring 23% of the study, 17% very loud, average pulse rate was 67.    Assessment:     1. Moderate obstructive sleep apnea    2. Encounter for long-term (current) use of medications    3. Need for vaccination    4. Enlarged tongue    5. Primary insomnia    6. Hypertension, unspecified type      MDM:   Moderate medical complexity.  Moderate risk.  Total time: 21 minutes.  This includes total time spent on the encounter, which includes face to face time and non-face to face time preparing to see the patient (eg, review of previous medical records, tests), Obtaining and/or reviewing separately obtained history, documenting clinical information in the electronic or other health record, independently interpreting results (not separately reported)/communicating results to the patient/family/caregiver, and/or care coordination (not separately reported).    I have Reviewed and summarized old records.  I have performed thorough medication reconciliation today and discussed risk and benefits of medications.  I have reviewed labs and discussed with patient.  All questions were answered.  I am requesting old records and will review them once they are available.  Hematology  Visit today included increased complexity associated with the care of the episodic problem see above assessment addressed and managing the longitudinal care of the patient due to the serious and/or complex managed problem(s) see above.  I have signed for the following orders AND/OR meds.  Orders Placed This Encounter   Procedures    Ambulatory referral/consult to Sleep Disorders     Standing Status:   Future     Standing Expiration Date:   12/6/2025     Referral Priority:   Routine     Referral Type:   Consultation     Requested Specialty:   Sleep Medicine     Number of Visits Requested:   1     Medications Ordered This Encounter   Medications    influenza (adjuvanted) (Fluad) 45 mcg/0.5 mL IM vaccine (> or = 66 yo) 0.5 mL        Follow up in about 6  months (around 5/6/2025), or if symptoms worsen or fail to improve.  Future Appointments       Date Provider Specialty Appt Notes    11/22/2024  Lab Dr. Bravo    11/22/2024 Richard German MD Hematology and Oncology follow up w/ labs    2/19/2025 Lejeune, Elizabeth B, NP Sleep Medicine Moderate obstructive sleep apnea [G47.33]          If no improvement in symptoms or symptoms worsen, advised to call/follow-up at clinic or go to ER. Patient voiced understanding and all questions/concerns were addressed.   DISCLAIMER: This note was compiled by using a speech recognition dictation system and therefore please be aware that typographical / speech recognition errors can and do occur.  Please contact me if you see any errors specifically.  Consent was obtained for MARIBEL recording system prior to the visit.    Juan Manuel Mireles M.D.       Office: [phone] [address] [place] [address]  FAX: [phone]

## 2024-11-06 NOTE — ASSESSMENT & PLAN NOTE
Patient following with Hematology for polycythemia.  Likely due to sleep apnea.  Test confirmed.  Complete history and physical was completed today.  Complete and thorough medication reconciliation was performed.  Discussed risks and benefits of medications.  Advised patient on orders and health maintenance.  We discussed old records and old labs if available.  Will request any records not available through epic.  Continue current medications listed on your summary sheet.

## 2024-11-22 ENCOUNTER — OFFICE VISIT (OUTPATIENT)
Dept: HEMATOLOGY/ONCOLOGY | Facility: CLINIC | Age: 69
End: 2024-11-22
Payer: MEDICARE

## 2024-11-22 ENCOUNTER — LAB VISIT (OUTPATIENT)
Dept: LAB | Facility: HOSPITAL | Age: 69
End: 2024-11-22
Payer: MEDICARE

## 2024-11-22 VITALS
HEART RATE: 61 BPM | OXYGEN SATURATION: 96 % | RESPIRATION RATE: 16 BRPM | WEIGHT: 173.5 LBS | HEIGHT: 65 IN | BODY MASS INDEX: 28.91 KG/M2 | DIASTOLIC BLOOD PRESSURE: 84 MMHG | SYSTOLIC BLOOD PRESSURE: 122 MMHG | TEMPERATURE: 98 F

## 2024-11-22 DIAGNOSIS — D75.1 POLYCYTHEMIA: ICD-10-CM

## 2024-11-22 DIAGNOSIS — D50.8 OTHER IRON DEFICIENCY ANEMIA: ICD-10-CM

## 2024-11-22 DIAGNOSIS — D75.1 POLYCYTHEMIA: Primary | ICD-10-CM

## 2024-11-22 LAB
BASOPHILS # BLD AUTO: 0.08 K/UL (ref 0–0.2)
BASOPHILS NFR BLD: 1 % (ref 0–1.9)
DIFFERENTIAL METHOD BLD: ABNORMAL
EOSINOPHIL # BLD AUTO: 0.2 K/UL (ref 0–0.5)
EOSINOPHIL NFR BLD: 2.5 % (ref 0–8)
ERYTHROCYTE [DISTWIDTH] IN BLOOD BY AUTOMATED COUNT: 14 % (ref 11.5–14.5)
HCT VFR BLD AUTO: 56.5 % (ref 40–54)
HGB BLD-MCNC: 18.3 G/DL (ref 14–18)
IMM GRANULOCYTES # BLD AUTO: 0.01 K/UL (ref 0–0.04)
IMM GRANULOCYTES NFR BLD AUTO: 0.1 % (ref 0–0.5)
LYMPHOCYTES # BLD AUTO: 2.3 K/UL (ref 1–4.8)
LYMPHOCYTES NFR BLD: 29.5 % (ref 18–48)
MCH RBC QN AUTO: 28.1 PG (ref 27–31)
MCHC RBC AUTO-ENTMCNC: 32.4 G/DL (ref 32–36)
MCV RBC AUTO: 87 FL (ref 82–98)
MONOCYTES # BLD AUTO: 0.7 K/UL (ref 0.3–1)
MONOCYTES NFR BLD: 9 % (ref 4–15)
NEUTROPHILS # BLD AUTO: 4.5 K/UL (ref 1.8–7.7)
NEUTROPHILS NFR BLD: 57.9 % (ref 38–73)
NRBC BLD-RTO: 0 /100 WBC
PLATELET # BLD AUTO: 218 K/UL (ref 150–450)
PMV BLD AUTO: 11.6 FL (ref 9.2–12.9)
RBC # BLD AUTO: 6.51 M/UL (ref 4.6–6.2)
WBC # BLD AUTO: 7.7 K/UL (ref 3.9–12.7)

## 2024-11-22 PROCEDURE — 85025 COMPLETE CBC W/AUTO DIFF WBC: CPT | Mod: PN | Performed by: INTERNAL MEDICINE

## 2024-11-22 PROCEDURE — 99999 PR PBB SHADOW E&M-EST. PATIENT-LVL III: CPT | Mod: PBBFAC,,, | Performed by: INTERNAL MEDICINE

## 2024-11-22 PROCEDURE — 36415 COLL VENOUS BLD VENIPUNCTURE: CPT | Mod: PN | Performed by: INTERNAL MEDICINE

## 2024-11-22 NOTE — PROGRESS NOTES
Subjective     Patient ID: Mina Viveros Sr. is a 69 y.o. male.    Chief Complaint: Follow-up (Polycythemia)    HPI  Mr. Viveros returns today for follow up.  I had seen him initially in May and he was seen by our nurse practitioner in June.  His last visit with me was in mid September 2024.  On October 25, 2024 he underwent a sleep apnea study that confirmed that he has moderate positional sleep apnea.      His CBC today shows a white count of 7,700 per cubic mm, hemoglobin 18.3 grams/deciliter, hematocrit 56.5%, MCV 87, and platelets 218K.    Briefly, he is a 68-year-old male referred for evaluation for polycythemia.  His EPO level was not inappropriately low while he was ruled out for JAK2 mutation.    ROS: Overall he feels well.  When asked, his wife confirms that he does snore at night.  He also complains of at times non restorative sleep and daytime sleepiness.  His ECOG PS is 1.  He denies any anxiety, depression, easy bruising, fevers, chills, night  sweats, weight loss, nausea, vomiting, diarrhea, constipation, diplopia, blurred vision, headache, chest pain, palpitations, shortness of breath, breast pain, abdominal pain, extremity pain, or difficulty ambulating.  The remainder of the ten-point ROS, including general, skin, lymph, H/N, cardiorespiratory, GI, , Neuro, Endocrine, and psychiatric is negative.     PHYSICAL EXAMINATION  He is alert, oriented to time, place, person, pleasant, well      nourished, in no acute physical distress.                                    VITAL SIGNS:  Reviewed                                      HEENT:  Normal.  There are no nasal, oral, lip, gingival, auricular, lid,    or conjunctival lesions.  Mucosae are moist and pink, and there is no        thrush.  Pupils are equal, reactive to light and accommodation.              Extraocular muscle movements are intact.    Dentition is good.  There is no frontal or maxillary tenderness.                                    NECK:  Supple without JVD, adenopathy, or thyromegaly.                       LUNGS:  Clear to auscultation without wheezing, rales, or rhonchi.           CARDIOVASCULAR:  Reveals an S1, S2, no murmurs, no rubs, no gallops.         ABDOMEN:  Soft, nontender, without organomegaly.  Bowel sounds are    present.                                                                     EXTREMITIES:  No cyanosis, clubbing, or edema.                                                               LYMPHATIC:  There is no cervical, axillary, or supraclavicular adenopathy.   SKIN:  Warm and moist, without petechiae, rashes, induration, or ecchymoses.           NEUROLOGIC:  DTRs are 0-1+ bilaterally, symmetrical, motor function is 5/5,  and cranial nerves are  within normal limits.    ASSESSMENT  Polycythemia.  I suspect that his polycythemia secondary to sleep apnea  Obstructive Sleep apnea        PLAN  I had a very long discussion with Mr. Viveros and his wife.  At this point we will proceed as follows:  There is no need for immediate phlebotomies today.  I explained to him that we will we will phlebotomize if his hematocrit rises to above 60%.  I urged him to donate 1 unit of PRBCs if he wants  He should proceed with treatment for his sleep apnea study  We will see him again in 4 months with a repeat CBC  His multiple questions were answered to his satisfaction.    I spent approximately 30 minutes reviewing the available records, evaluating the patient, in counseling and coordinating this patient's care.  I will see him in 4 months with a repeat CBC    Route Chart for Scheduling    Med Onc Chart Routing      Follow up with physician 3 months. with labs one day prior   Follow up with ARIANNA    Infusion scheduling note    Injection scheduling note    Labs    Imaging    Pharmacy appointment    Other referrals

## 2025-01-14 DIAGNOSIS — Z00.00 ENCOUNTER FOR MEDICARE ANNUAL WELLNESS EXAM: ICD-10-CM

## 2025-02-19 ENCOUNTER — OFFICE VISIT (OUTPATIENT)
Dept: SLEEP MEDICINE | Facility: CLINIC | Age: 70
End: 2025-02-19
Payer: MEDICARE

## 2025-02-19 VITALS
SYSTOLIC BLOOD PRESSURE: 120 MMHG | WEIGHT: 177.25 LBS | RESPIRATION RATE: 18 BRPM | BODY MASS INDEX: 29.53 KG/M2 | DIASTOLIC BLOOD PRESSURE: 82 MMHG | HEART RATE: 84 BPM | OXYGEN SATURATION: 97 % | HEIGHT: 65 IN

## 2025-02-19 DIAGNOSIS — K14.8 ENLARGED TONGUE: ICD-10-CM

## 2025-02-19 DIAGNOSIS — D75.1 POLYCYTHEMIA: Primary | ICD-10-CM

## 2025-02-19 DIAGNOSIS — I10 HYPERTENSION, UNSPECIFIED TYPE: Chronic | ICD-10-CM

## 2025-02-19 DIAGNOSIS — G47.33 MODERATE OBSTRUCTIVE SLEEP APNEA: ICD-10-CM

## 2025-02-19 NOTE — PROGRESS NOTES
Subjective:      Patient ID: Mina Viveros Sr. is a 69 y.o. male.    Chief Complaint: Sleep Apnea    HPI    Patient presents today for evaluation of sleep apnea.  Patient with snoring and witnessed apneas. He is waking up gasping. Patient not having problems falling asleep, but wakes up frequently throughout the night.  Patient does not wake up feeling refreshed in the morning.  Patient with daytime hypersomnolence.  Patient has had symptoms for a few years. Comorbidities include polycythemia, HTN with elevated readings.  Bedtime: 9 PM  Wake time: 5AM  He had HST    STOP - BANG Questionnaire:     1. Snoring : Do you snore loudly ?    Yes    2. Tired : Do you often feel tired, fatigued, or sleepy during daytime?   Yes    3. Observed: Has anyone observed you stop breathing during your sleep?   Yes    4. Blood pressure : Do you have or are you being treated for high blood pressure?   Yes    5. BMI :BMI more than 35 kg/m2?   No    6. Age : Age over 50 yr old?   Yes    7. Neck circumference: Neck circumference greater than 40 cm?   No    8. Gender: Gender male?   Yes    High risk of BAYLEE: Yes 5 - 8  Intermediate risk of BAYLEE: Yes 3 - 4  Low risk of BAYLEE: Yes 0 - 2      References:   STOP Questionnaire   A Tool to Screen Patients for Obstructive Sleep Apnea: LARS Garcia.C.P.C., HENRRY Montemayor.B.B.S., Cassidy Rubin M.D.,Telma Weaver, Ph.D., Robles Madera M.B.B.S.,_ Gilberto Diaz.,_ Eric Levine M.D., Jono Lamb F.R.C.P.C.; Anesthesiology 2008; 108:812-21 Copyright © 2008, the American Society of Anesthesiologists, Inc. [employer]       [place] Questionnaire (validated BAYLEE screening questionnaire)    Yes -- Snoring/apnea    Yes -- Fatigue    Body mass index is Body mass index is 29.5 kg/m²..  (>25 is overweight, >30 is obese)    Blood Pressure = Hypertension  (PreHTN 120-139/80-89, Stg1 140-159/90-99, Stg2 >160/>100)  [place] = three of three BAYLEE categories are  "positive (high risk is 2-3 positive categories)         2/17/2025    10:30 AM   EPWORTH SLEEPINESS SCALE   Sitting and reading 2   Watching TV 3   Sitting, inactive in a public place (e.g. a theatre or a meeting) 1   As a passenger in a car for an hour without a break 0   Lying down to rest in the afternoon when circumstances permit 3   Sitting and talking to someone 0   Sitting quietly after a lunch without alcohol 1   In a car, while stopped for a few minutes in traffic 0   Total score 10        Patient-reported      (validated sleepiness questionnaire with a higher score indicating greater sleepiness; range 0-24)    Problem List[1]      /82 (Patient Position: Sitting)   Pulse 84   Resp 18   Ht 5' 5" (1.651 m)   Wt 80.4 kg (177 lb 4 oz)   SpO2 97%   BMI 29.50 kg/m²   Body mass index is 29.5 kg/m².    Review of Systems   Constitutional:  Positive for fatigue.   Respiratory:  Positive for snoring and somnolence.    Psychiatric/Behavioral:  Positive for sleep disturbance.    All other systems reviewed and are negative.        Objective:      Physical Exam  Constitutional:       Appearance: Normal appearance.   HENT:      Head: Normocephalic and atraumatic.      Nose: Nose normal.      Mouth/Throat:      Comments: Mallampati Score: III/IV    Cardiovascular:      Rate and Rhythm: Normal rate and regular rhythm.   Pulmonary:      Effort: Pulmonary effort is normal.      Breath sounds: Normal breath sounds.   Abdominal:      Palpations: Abdomen is soft.      Tenderness: There is no abdominal tenderness.   Musculoskeletal:         General: Normal range of motion.      Cervical back: Normal range of motion and neck supple.   Skin:     General: Skin is warm and dry.   Neurological:      General: No focal deficit present.      Mental Status: He is alert and oriented to person, place, and time.   Psychiatric:         Mood and Affect: Mood normal.         Behavior: Behavior normal.       Personal Diagnostic " Review  HST reviewed with AHI 15/hr    Assessment:     1. Polycythemia    2. Moderate obstructive sleep apnea    3. Enlarged tongue    4. Hypertension, unspecified type       Encounter Medications[2]  Orders Placed This Encounter   Procedures    CPAP FOR HOME USE     Length of need (1-99 months)::   99     Type ()::   Auto CPAP     Auto CPAP pressure setting range (cmH20)::   5-15     Fulfillment Priority::   Level 4:  all others     Humidification ()::   Heated     Choose ONE mask type and its corresponding cushions and/or pillows::    Nasal Mask, 1 per 90 days:  Nasal Cushions, (6 per 90 days):  Nasal Pillows, (6 per 90 days)     Choose EITHER Heated or Non-Heated Tubjing:    Heated Tubing, 1 per 6 months     All other supplies as needed as listed below::    Headgear, 1 per 180 days     All other supplies as needed as listed below::    Disposable Filter, 6 per 90 days     All other supplies as needed as listed below::    Non-Disposable Filter, 1 per 180 days     All other supplies as needed as listed below::    Humidifier Chamber, 1 per 180 days     All other supplies as needed as listed below::    Chin Strap, 1 per 180 days     Plan:     1. Polycythemia    2. Moderate obstructive sleep apnea  Overview:  PHYSICIAN INTERPRETATION AND COMMENTS: Findings are consistent with moderate, positional obstructive sleep  apnea(BAYLEE), with indications of respiratory control instability. Please refer to sleep disorders clinic  CLINICAL HISTORY: 68 year old male presented with: 16 inch neck, BMI of 29, an Vil[place] sleepiness score of 6, no comorbidities  and symptoms of nocturnal snoring, waking up choking and witnessed apneas. Based on the clinical history,  the patient has a high pre-test probability of having Severe BAYLEE.  SLEEP STUDY FINDINGS: Patient underwent a 1 night Home Sleep Test and by behavioral criteria, slept for approximately  6.77 hours , with a sleep  efficiency of 97%. Moderate sleep disordered breathing (AHI=15) is noted based on a 4% hypopnea  desaturation criteria, predominantly in the supine position (64 events/hour) and with indications of respiratory control  instability. The patient slept supine 14.6% of the night based on valid recording time of 6.78 hours and is 10.7 times as  likely to have apneas/hypopneas when supine. When considering more subtle measures of sleep disordered breathing, the  overall respiratory disturbance index is moderate(RDI=22) based on a 1% hypopnea desaturation criteria with  confirmation by surrogate arousal indicators. The apneas/hypopneas are accompanied by minimal oxygen desaturation  (percent time below 90% SpO2: 0%, Min SpO2: 91.2%). The average desaturation across all sleep disordered breathing  events is 2.1%. Snoring occurs for 23.2% (30 dB) of the study, 17.1% is very loud. The mean pulse rate is 67 BPM, with very  frequent pulse rate variability (71 events with >= 6 BPM increase/decrease per hour).  TREATMENT CONSIDERATIONS: Consider an attended CPAP titration study. Presence of respiratory control instability  should be evaluated by a sleep specialist prior to initiating positive pressure treatment. Consider nasal continuous  positive airway pressure based on the AHI severity. The patient should avoid sleeping supine; the non-supine AHI is 10.7  times less severe than the supine AHI.  DISEASE MANAGEMENT CONSIDERATIONS: None.          Assessment & Plan:  AutoPAP and follow up in 10 weeks with download of data card and review of symptoms.     Orders:  -     Ambulatory referral/consult to Sleep Disorders  -     CPAP FOR HOME USE    3. Enlarged tongue  Overview:  Patient with sleep apnea      4. Hypertension, unspecified type  Overview:  CHRONIC. STABLE. BP Reviewed.  Compliant with BP medications. No SE reported. November 2024:      (-) CP, SOB, palpitations, dizziness, lightheadedness, HA, arm numbness, tingling or  weakness, syncope.  Creatinine   Date Value Ref Range Status   08/13/2024 1.1 0.5 - 1.4 mg/dL Final         No results found for this or any previous visit.                  [1]   Patient Active Problem List  Diagnosis    Primary osteoarthritis of right hip    Encounter for long-term (current) use of medications    Hypertension    Polycythemia    BPH with obstruction/lower urinary tract symptoms    Suspected sleep apnea    Primary insomnia    Mixed hyperlipidemia    Moderate obstructive sleep apnea    Enlarged tongue   [2]   Outpatient Encounter Medications as of 2/19/2025   Medication Sig Dispense Refill    atorvastatin (LIPITOR) 10 MG tablet TAKE ONE TABLET BY MOUTH ONCE DAILY 90 tablet 3    cyanocobalamin (VITAMIN B-12) 1000 MCG tablet       diclofenac (VOLTAREN) 75 MG EC tablet TAKE ONE TABLET BY MOUTH TWICE DAILY 60 tablet 0    fluticasone propionate (FLONASE ALLERGY RELIEF) 50 mcg/actuation nasal spray 1 spray in each nostril Nasally Once a day for 30 day(s)      multivitamin with minerals tablet Take 1 tablet by mouth once daily.      tamsulosin (FLOMAX) 0.4 mg Cap TAKE ONE CAPSULE BY MOUTH ONCE DAILY 90 capsule 3     No facility-administered encounter medications on file as of 2/19/2025.

## 2025-02-21 ENCOUNTER — LAB VISIT (OUTPATIENT)
Dept: LAB | Facility: HOSPITAL | Age: 70
End: 2025-02-21
Attending: INTERNAL MEDICINE
Payer: MEDICARE

## 2025-02-21 DIAGNOSIS — D75.1 POLYCYTHEMIA: ICD-10-CM

## 2025-02-21 DIAGNOSIS — D50.8 OTHER IRON DEFICIENCY ANEMIA: ICD-10-CM

## 2025-02-21 LAB
ALBUMIN SERPL BCP-MCNC: 4 G/DL (ref 3.5–5.2)
ALP SERPL-CCNC: 63 U/L (ref 40–150)
ALT SERPL W/O P-5'-P-CCNC: 31 U/L (ref 10–44)
ANION GAP SERPL CALC-SCNC: 11 MMOL/L (ref 8–16)
AST SERPL-CCNC: 24 U/L (ref 10–40)
BASOPHILS # BLD AUTO: 0.09 K/UL (ref 0–0.2)
BASOPHILS NFR BLD: 1.1 % (ref 0–1.9)
BILIRUB SERPL-MCNC: 0.4 MG/DL (ref 0.1–1)
BUN SERPL-MCNC: 19 MG/DL (ref 8–23)
CALCIUM SERPL-MCNC: 9.4 MG/DL (ref 8.7–10.5)
CHLORIDE SERPL-SCNC: 107 MMOL/L (ref 95–110)
CO2 SERPL-SCNC: 22 MMOL/L (ref 23–29)
CREAT SERPL-MCNC: 1.1 MG/DL (ref 0.5–1.4)
DIFFERENTIAL METHOD BLD: ABNORMAL
EOSINOPHIL # BLD AUTO: 0.2 K/UL (ref 0–0.5)
EOSINOPHIL NFR BLD: 2.5 % (ref 0–8)
ERYTHROCYTE [DISTWIDTH] IN BLOOD BY AUTOMATED COUNT: 13.6 % (ref 11.5–14.5)
EST. GFR  (NO RACE VARIABLE): >60 ML/MIN/1.73 M^2
FERRITIN SERPL-MCNC: 126 NG/ML (ref 20–300)
GLUCOSE SERPL-MCNC: 93 MG/DL (ref 70–110)
HCT VFR BLD AUTO: 53.6 % (ref 40–54)
HGB BLD-MCNC: 17.7 G/DL (ref 14–18)
IMM GRANULOCYTES # BLD AUTO: 0.04 K/UL (ref 0–0.04)
IMM GRANULOCYTES NFR BLD AUTO: 0.5 % (ref 0–0.5)
LYMPHOCYTES # BLD AUTO: 2.1 K/UL (ref 1–4.8)
LYMPHOCYTES NFR BLD: 26.5 % (ref 18–48)
MCH RBC QN AUTO: 28.4 PG (ref 27–31)
MCHC RBC AUTO-ENTMCNC: 33 G/DL (ref 32–36)
MCV RBC AUTO: 86 FL (ref 82–98)
MONOCYTES # BLD AUTO: 0.7 K/UL (ref 0.3–1)
MONOCYTES NFR BLD: 8.1 % (ref 4–15)
NEUTROPHILS # BLD AUTO: 4.9 K/UL (ref 1.8–7.7)
NEUTROPHILS NFR BLD: 61.3 % (ref 38–73)
NRBC BLD-RTO: 0 /100 WBC
PLATELET # BLD AUTO: 223 K/UL (ref 150–450)
PMV BLD AUTO: 11.4 FL (ref 9.2–12.9)
POTASSIUM SERPL-SCNC: 4.8 MMOL/L (ref 3.5–5.1)
PROT SERPL-MCNC: 7.9 G/DL (ref 6–8.4)
RBC # BLD AUTO: 6.24 M/UL (ref 4.6–6.2)
SODIUM SERPL-SCNC: 140 MMOL/L (ref 136–145)
WBC # BLD AUTO: 8.05 K/UL (ref 3.9–12.7)

## 2025-02-21 PROCEDURE — 85025 COMPLETE CBC W/AUTO DIFF WBC: CPT | Mod: PN | Performed by: INTERNAL MEDICINE

## 2025-02-21 PROCEDURE — 82728 ASSAY OF FERRITIN: CPT | Performed by: INTERNAL MEDICINE

## 2025-02-21 PROCEDURE — 80053 COMPREHEN METABOLIC PANEL: CPT | Mod: PN | Performed by: INTERNAL MEDICINE

## 2025-02-21 PROCEDURE — 36415 COLL VENOUS BLD VENIPUNCTURE: CPT | Mod: PN | Performed by: INTERNAL MEDICINE

## 2025-02-24 ENCOUNTER — OFFICE VISIT (OUTPATIENT)
Dept: HEMATOLOGY/ONCOLOGY | Facility: CLINIC | Age: 70
End: 2025-02-24
Payer: MEDICARE

## 2025-02-24 VITALS
TEMPERATURE: 97 F | HEIGHT: 65 IN | RESPIRATION RATE: 18 BRPM | DIASTOLIC BLOOD PRESSURE: 78 MMHG | SYSTOLIC BLOOD PRESSURE: 116 MMHG | HEART RATE: 94 BPM | WEIGHT: 181 LBS | BODY MASS INDEX: 30.16 KG/M2 | OXYGEN SATURATION: 99 %

## 2025-02-24 DIAGNOSIS — D75.1 POLYCYTHEMIA: Primary | ICD-10-CM

## 2025-02-24 DIAGNOSIS — D50.8 OTHER IRON DEFICIENCY ANEMIA: ICD-10-CM

## 2025-02-24 PROCEDURE — 3074F SYST BP LT 130 MM HG: CPT | Mod: CPTII,S$GLB,,

## 2025-02-24 PROCEDURE — 1101F PT FALLS ASSESS-DOCD LE1/YR: CPT | Mod: CPTII,S$GLB,,

## 2025-02-24 PROCEDURE — 3078F DIAST BP <80 MM HG: CPT | Mod: CPTII,S$GLB,,

## 2025-02-24 PROCEDURE — 1160F RVW MEDS BY RX/DR IN RCRD: CPT | Mod: CPTII,S$GLB,,

## 2025-02-24 PROCEDURE — 3288F FALL RISK ASSESSMENT DOCD: CPT | Mod: CPTII,S$GLB,,

## 2025-02-24 PROCEDURE — 99212 OFFICE O/P EST SF 10 MIN: CPT | Mod: S$GLB,,,

## 2025-02-24 PROCEDURE — 3008F BODY MASS INDEX DOCD: CPT | Mod: CPTII,S$GLB,,

## 2025-02-24 PROCEDURE — 99999 PR PBB SHADOW E&M-EST. PATIENT-LVL III: CPT | Mod: PBBFAC,,,

## 2025-02-24 PROCEDURE — 1159F MED LIST DOCD IN RCRD: CPT | Mod: CPTII,S$GLB,,

## 2025-02-24 PROCEDURE — 1126F AMNT PAIN NOTED NONE PRSNT: CPT | Mod: CPTII,S$GLB,,

## 2025-02-24 NOTE — PROGRESS NOTES
PATIENT: Mina Viveros Sr.  MRN: [id number]  DATE: 2/24/2025      Diagnosis:   1. Polycythemia    2. Other iron deficiency anemia        Chief Complaint: Follow-up ( 3 mth follow up w/ labs)          Subjective:    Interval History: Mr. Viveros is a 69 y.o. male who returns for follow up. He reports feeling well today. He was evaluated by sleep medicine and will be receiving his cpap soon. He also reports donating blood since previous visit.       His labs from 2/21/25 show a white count of 8,050 per cubic mm, hemoglobin 17.7 grams/deciliter, hematocrit 53.6%, MCV 86, and platelets 223K. Ferritin 126.     Prior History:   Per Record:   On October 25, 2024 he underwent a sleep apnea study that confirmed that he has moderate positional sleep apnea.        His CBC today shows a white count of 7,700 per cubic mm, hemoglobin 18.3 grams/deciliter, hematocrit 56.5%, MCV 87, and platelets 218K.     Briefly, he is a 68-year-old male referred for evaluation for polycythemia.  His EPO level was not inappropriately low while he was ruled out for JAK2 mutation.  Oncology History    No history exists.       Past Medical History:   Past Medical History:   Diagnosis Date    Allergy     Asthma     Childhood Asthma    Kidney stone 2000    x 1 - passed       Past Surgical HIstory: No past surgical history on file.    Family History:   Family History   Problem Relation Name Age of Onset    Arthritis Mother Lisa Rainey        Social History:  reports that he has never smoked. He has never used smokeless tobacco. He reports that he does not currently use alcohol. He reports that he does not use drugs.    Allergies:  Review of patient's allergies indicates:  No Known Allergies    Medications:  Current Medications[1]    Review of Systems   Constitutional:  Negative for appetite change and unexpected weight change.   HENT:  Negative for mouth sores.    Eyes:  Negative for visual disturbance.   Respiratory:  Negative for cough and  "shortness of breath.    Cardiovascular:  Negative for chest pain.   Gastrointestinal:  Negative for abdominal pain and diarrhea.   Genitourinary:  Positive for frequency.   Musculoskeletal:  Negative for back pain.   Skin:  Negative for rash.   Neurological:  Negative for headaches.   Hematological:  Negative for adenopathy.   Psychiatric/Behavioral:  The patient is not nervous/anxious.        ECOG Performance Status:   ECOG SCORE             Objective:      Vitals:   Vitals:    02/24/25 1513   BP: 116/78   BP Location: Left arm   Patient Position: Sitting   Pulse: 94   Resp: 18   Temp: 97.3 °F (36.3 °C)   TempSrc: Temporal   SpO2: 99%   Weight: 82.1 kg (181 lb)   Height: 5' 5" (1.651 m)     BMI: Body mass index is 30.12 kg/m².    Physical Exam  HENT:      Head: Normocephalic.      Nose: Nose normal.      Mouth/Throat:      Mouth: Mucous membranes are moist.      Pharynx: Oropharynx is clear.   Cardiovascular:      Rate and Rhythm: Normal rate and regular rhythm.      Heart sounds: Normal heart sounds.   Pulmonary:      Effort: Pulmonary effort is normal.      Breath sounds: Normal breath sounds.   Abdominal:      General: Bowel sounds are normal.   Skin:     General: Skin is warm and dry.   Neurological:      Mental Status: He is alert and oriented to person, place, and time.   Psychiatric:         Mood and Affect: Mood normal.         Behavior: Behavior normal.         Laboratory Data:  Lab Visit on 02/21/2025   Component Date Value Ref Range Status    Sodium 02/21/2025 140  136 - 145 mmol/L Final    Potassium 02/21/2025 4.8  3.5 - 5.1 mmol/L Final    Chloride 02/21/2025 107  95 - 110 mmol/L Final    CO2 02/21/2025 22 (L)  23 - 29 mmol/L Final    Glucose 02/21/2025 93  70 - 110 mg/dL Final    BUN 02/21/2025 19  8 - 23 mg/dL Final    Creatinine 02/21/2025 1.1  0.5 - 1.4 mg/dL Final    Calcium 02/21/2025 9.4  8.7 - 10.5 mg/dL Final    Total Protein 02/21/2025 7.9  6.0 - 8.4 g/dL Final    Albumin 02/21/2025 4.0  3.5 " - 5.2 g/dL Final    Total Bilirubin 02/21/2025 0.4  0.1 - 1.0 mg/dL Final    Alkaline Phosphatase 02/21/2025 63  40 - 150 U/L Final    AST 02/21/2025 24  10 - 40 U/L Final    ALT 02/21/2025 31  10 - 44 U/L Final    eGFR 02/21/2025 >60.0  >60 mL/min/1.73 m^2 Final    Anion Gap 02/21/2025 11  8 - 16 mmol/L Final    WBC 02/21/2025 8.05  3.90 - 12.70 K/uL Final    RBC 02/21/2025 6.24 (H)  4.60 - 6.20 M/uL Final    Hemoglobin 02/21/2025 17.7  14.0 - 18.0 g/dL Final    Hematocrit 02/21/2025 53.6  40.0 - 54.0 % Final    MCV 02/21/2025 86  82 - 98 fL Final    MCH 02/21/2025 28.4  27.0 - 31.0 pg Final    MCHC 02/21/2025 33.0  32.0 - 36.0 g/dL Final    RDW 02/21/2025 13.6  11.5 - 14.5 % Final    Platelets 02/21/2025 223  150 - 450 K/uL Final    MPV 02/21/2025 11.4  9.2 - 12.9 fL Final    Immature Granulocytes 02/21/2025 0.5  0.0 - 0.5 % Final    Gran # (ANC) 02/21/2025 4.9  1.8 - 7.7 K/uL Final    Immature Grans (Abs) 02/21/2025 0.04  0.00 - 0.04 K/uL Final    Lymph # 02/21/2025 2.1  1.0 - 4.8 K/uL Final    Mono # 02/21/2025 0.7  0.3 - 1.0 K/uL Final    Eos # 02/21/2025 0.2  0.0 - 0.5 K/uL Final    Baso # 02/21/2025 0.09  0.00 - 0.20 K/uL Final    nRBC 02/21/2025 0  0 /100 WBC Final    Gran % 02/21/2025 61.3  38.0 - 73.0 % Final    Lymph % 02/21/2025 26.5  18.0 - 48.0 % Final    Mono % 02/21/2025 8.1  4.0 - 15.0 % Final    Eosinophil % 02/21/2025 2.5  0.0 - 8.0 % Final    Basophil % 02/21/2025 1.1  0.0 - 1.9 % Final    Differential Method 02/21/2025 Automated   Final    Ferritin 02/21/2025 126  20.0 - 300.0 ng/mL Final          Imaging: Reviewed   Assessment:       1. Polycythemia    2. Other iron deficiency anemia           Plan:     There is no need for immediate phlebotomies today.  I explained to him that we will we will phlebotomize if his hematocrit rises to above 60%.  He should proceed with treatment for his sleep apnea study  We will see him again in 3 months with a repeat CBC  His multiple questions were answered to  his satisfaction.        Med Onc Chart Routing      Follow up with physician 3 months. With repeat cbc, cmp, ferritin   Follow up with ARIANNA    Infusion scheduling note    Injection scheduling note    Labs    Imaging    Pharmacy appointment    Other referrals                  Plan was discussed with the patient at length, and he verbalized understanding. Mina was given an opportunity to ask questions that were answered to his satisfaction, and he was advised to call in the interval if any problems or questions arise.    Assessment/Plan reviewed and approved by Dr German    11 minutes were spent in coordination of patient's care, record review and counseling.    STANLEY Reed, FNP-C  Hematology & Oncology         [1]   Current Outpatient Medications   Medication Sig Dispense Refill    atorvastatin (LIPITOR) 10 MG tablet TAKE ONE TABLET BY MOUTH ONCE DAILY 90 tablet 3    cyanocobalamin (VITAMIN B-12) 1000 MCG tablet       diclofenac (VOLTAREN) 75 MG EC tablet TAKE ONE TABLET BY MOUTH TWICE DAILY 60 tablet 0    fluticasone propionate (FLONASE ALLERGY RELIEF) 50 mcg/actuation nasal spray 1 spray in each nostril Nasally Once a day for 30 day(s)      multivitamin with minerals tablet Take 1 tablet by mouth once daily.      tamsulosin (FLOMAX) 0.4 mg Cap TAKE ONE CAPSULE BY MOUTH ONCE DAILY 90 capsule 3     No current facility-administered medications for this visit.

## 2025-04-01 ENCOUNTER — PATIENT MESSAGE (OUTPATIENT)
Dept: FAMILY MEDICINE | Facility: CLINIC | Age: 70
End: 2025-04-01
Payer: MEDICARE

## 2025-04-01 ENCOUNTER — E-VISIT (OUTPATIENT)
Dept: FAMILY MEDICINE | Facility: CLINIC | Age: 70
End: 2025-04-01
Payer: MEDICARE

## 2025-04-01 DIAGNOSIS — F51.01 PRIMARY INSOMNIA: Primary | Chronic | ICD-10-CM

## 2025-04-01 DIAGNOSIS — R06.83 SNORING: ICD-10-CM

## 2025-04-02 ENCOUNTER — TELEPHONE (OUTPATIENT)
Dept: SLEEP MEDICINE | Facility: CLINIC | Age: 70
End: 2025-04-02
Payer: MEDICARE

## 2025-04-02 NOTE — PROGRESS NOTES
Patient ID: Mina Viveros Sr. is a 69 y.o. male.    Chief Complaint: Insomnia (Entered automatically based on patient selection in [employer].)    The patient initiated a request through [employer] on 4/1/2025 for evaluation and management with a chief complaint of Insomnia (Entered automatically based on patient selection in [employer].)     I evaluated the questionnaire submission on 04/02/2025  .    [facility] Peq Evisit Insomnia    4/2/2025  1:13 PM CDT - Filed by Patient   Do you agree to participate in an E-Visit? Yes   If you have any of the following symptoms, please go to the nearest emergency room or call 911. I acknowledge   Choose the state of your primary residence [place]   What is the main issue you would like addressed today? Need referral to an ENT   In the past two weeks, how would you rate your difficulty with  falling asleep? None   In the past two weeks, how would you rate your difficulty with staying asleep? None   How often do you wake up early? Never   How many days a week do you have a problem with your sleep?  0-1   How would you rate the quality of your sleep? Average   Has anyone noticed you stop breathing during sleep? No   Do you have any of the following symptoms? Snoring;  Waking up to urinate;  Waking up gasping or choking   How long have you had insomnia? Less than a month   Before your insomnia problem started, did you have any of the following? None   I would like to address: Other concerns related to Insomnia   Please describe your symptoms. I would like to see an E.N.T for breathing issues with the nose. Not insomnia   On a scale of 1-10, where 10 is the worst you can imagine, how severe are your symptoms? (range: 1 - 10) 1   Have you had these symptoms before? Yes   How long have you had these symptoms? More than a month   What helps with your symptoms? Breathing through my mouth   What makes your symptoms feel worse? Not breathing theough my mouth   Are your symptoms related to a  condition you currently have? Not sure   When were you last seen for this condition?    Please describe any probable cause for your symptoms. Sinus or uncertainty   Provide any additional information you feel is important. I dont feel i gave insomnia i fall asleep quickly and can fall back to sleep after waking up with no issues. My concern is not breathing properly through my nose.   Please attach any relevant images or files    Are you able to take your vital signs? No         Encounter Diagnoses   Name Primary?    Primary insomnia Yes    Snoring         Orders Placed This Encounter   Procedures    Ambulatory referral/consult to ENT     Standing Status:   Future     Expected Date:   4/9/2025     Expiration Date:   5/2/2026     Referral Priority:   Routine     Referral Type:   Consultation     Referral Reason:   Specialty Services Required     Requested Specialty:   Otolaryngology     Number of Visits Requested:   1    Home Sleep Study     Standing Status:   Future     Expiration Date:   4/2/2026            Follow up if symptoms worsen or fail to improve.      E-Visit Time Tracking:    Day 1 Time (in minutes): 7    Total Time (in minutes): 7

## 2025-04-02 NOTE — TELEPHONE ENCOUNTER
----- Message from Shashi Mayes sent at 4/2/2025  2:33 PM CDT -----  [facility] Chart, [facility] [place]

## 2025-05-20 ENCOUNTER — OFFICE VISIT (OUTPATIENT)
Dept: OTOLARYNGOLOGY | Facility: CLINIC | Age: 70
End: 2025-05-20
Payer: MEDICARE

## 2025-05-20 VITALS — BODY MASS INDEX: 29.79 KG/M2 | WEIGHT: 178.81 LBS | HEIGHT: 65 IN

## 2025-05-20 DIAGNOSIS — R06.83 SNORING: ICD-10-CM

## 2025-05-20 DIAGNOSIS — K21.9 LARYNGOPHARYNGEAL REFLUX (LPR): ICD-10-CM

## 2025-05-20 DIAGNOSIS — J31.0 RHINITIS, UNSPECIFIED TYPE: ICD-10-CM

## 2025-05-20 DIAGNOSIS — J34.2 NASAL SEPTAL DEVIATION: ICD-10-CM

## 2025-05-20 DIAGNOSIS — J34.3 HYPERTROPHY OF BOTH INFERIOR NASAL TURBINATES: ICD-10-CM

## 2025-05-20 DIAGNOSIS — J34.89 NASAL OBSTRUCTION: ICD-10-CM

## 2025-05-20 DIAGNOSIS — G47.33 OSA (OBSTRUCTIVE SLEEP APNEA): Primary | ICD-10-CM

## 2025-05-20 PROCEDURE — 3008F BODY MASS INDEX DOCD: CPT | Mod: CPTII,S$GLB,, | Performed by: OTOLARYNGOLOGY

## 2025-05-20 PROCEDURE — 1160F RVW MEDS BY RX/DR IN RCRD: CPT | Mod: CPTII,S$GLB,, | Performed by: OTOLARYNGOLOGY

## 2025-05-20 PROCEDURE — 3288F FALL RISK ASSESSMENT DOCD: CPT | Mod: CPTII,S$GLB,, | Performed by: OTOLARYNGOLOGY

## 2025-05-20 PROCEDURE — 1101F PT FALLS ASSESS-DOCD LE1/YR: CPT | Mod: CPTII,S$GLB,, | Performed by: OTOLARYNGOLOGY

## 2025-05-20 PROCEDURE — 99204 OFFICE O/P NEW MOD 45 MIN: CPT | Mod: S$GLB,,, | Performed by: OTOLARYNGOLOGY

## 2025-05-20 PROCEDURE — 1126F AMNT PAIN NOTED NONE PRSNT: CPT | Mod: CPTII,S$GLB,, | Performed by: OTOLARYNGOLOGY

## 2025-05-20 PROCEDURE — 1159F MED LIST DOCD IN RCRD: CPT | Mod: CPTII,S$GLB,, | Performed by: OTOLARYNGOLOGY

## 2025-05-20 PROCEDURE — 99999 PR PBB SHADOW E&M-EST. PATIENT-LVL III: CPT | Mod: PBBFAC,,, | Performed by: OTOLARYNGOLOGY

## 2025-05-20 RX ORDER — FLUTICASONE PROPIONATE 50 MCG
2 SPRAY, SUSPENSION (ML) NASAL DAILY
Qty: 16 G | Refills: 11 | Status: SHIPPED | OUTPATIENT
Start: 2025-05-20

## 2025-05-20 RX ORDER — PANTOPRAZOLE SODIUM 40 MG/1
40 TABLET, DELAYED RELEASE ORAL DAILY
Qty: 90 TABLET | Refills: 3 | Status: SHIPPED | OUTPATIENT
Start: 2025-05-20 | End: 2026-05-20

## 2025-05-20 NOTE — PROGRESS NOTES
Subjective:       Patient ID: Mina Viveros Sr. is a 69 y.o. male.    Chief Complaint: Snoring, Sleep Apnea (Cpap intolerant ), and Nasal Congestion (At night )      Mina is here for nasal blockage.   History of Present Illness    Patient presents with concerns about mouth breathing, persistent phlegm in his throat, and difficulty using his CPAP machine for sleep apnea.    Patient reports ongoing mouth breathing and persistent phlegm in his throat, especially after eating. He describes constantly having phlegm that requires drainage or expectoration. He also reports occasional nasal discharge, but primarily nasal itching.    Patient was diagnosed with sleep apnea last year through a home sleep test. He has been prescribed a CPAP machine but has discontinued its use due to severe mouth dryness and developing a blister on his nose.    Patient describes nasal breathing difficulties, particularly at night when supine. He feels congested and resorts to mouth breathing, which leads to throat dryness. He also mentions a unilateral sensation of pharyngeal fullness and a need to clear his throat after eating, though he denies actual regurgitation or coughing up food.    Patient's wife reports that his frequent migraines have significantly [place]    Patient denies frequent sneezing, itching, watery eyes, food getting stuck in his throat, regurgitation, coughing up food, heartburn, or indigestion.    Patient uses Flonase (fluticasone nasal spray) when sick to blow his nose, but not on a regular basis. He was prescribed CPAP for sleep apnea but was unable to tolerate it due to mouth dryness and nose blister. Patient discontinued CPAP use because of these side effects    Patient underwent a home sleep test last year, which led to a diagnosis of sleep apnea.        ESS - 8    Patient validated questionnaires (if applicable):      %            No data to display                    No data to display                   No data to display                     Tobacco Use History[1]  Social History     Substance and Sexual Activity   Alcohol Use Not Currently          Objective:        Constitutional:   Vital signs are normal. He appears well-developed and well-nourished.     Head:  Normocephalic and atraumatic.     Ears:  Hearing normal to normal and whispered voice; external ear normal without scars, lesions, or masses; ear canal, tympanic membrane, and middle ear normal..     Nose:  Nose normal including turbinates, nasal mucosa, sinuses and nasal septum.     Mouth/Throat  Oropharynx clear and moist without lesions or asymmetry.     Neck:  Neck normal without thyromegaly masses, asymmetry, normal tracheal structure, crepitus, and tenderness.         Tests / Results:  I reviewed the home sleep test from October 24 which shows an AHI 4% a 15, predominantly in the supine position with 64 events per hour in the supine position.     Procedure Note    PHYSICIAN INTERPRETATION AND COMMENTS: Findings are consistent with moderate, positional obstructive sleep  apnea(BAYLEE), with indications of respiratory control instability. Please refer to sleep disorders clinic  CLINICAL HISTORY: 68 year old male presented with: 16 inch neck, BMI of 29, an [place] sleepiness score of 6, no comorbidities  and symptoms of nocturnal snoring, waking up choking and witnessed apneas. Based on the clinical history,  the patient has a high pre-test probability of having Severe BAYLEE.  SLEEP STUDY FINDINGS: Patient underwent a 1 night Home Sleep Test and by behavioral criteria, slept for approximately  6.77 hours , with a sleep efficiency of 97%. Moderate sleep disordered breathing (AHI=15) is noted based on a 4% hypopnea  desaturation criteria, predominantly in the supine position (64 events/hour) and with indications of respiratory control  instability. The patient slept supine 14.6% of the night based on  valid recording time of 6.78 hours and is 10.7 times as  likely to have apneas/hypopneas when supine. When considering more subtle measures of sleep disordered breathing, the  overall respiratory disturbance index is moderate(RDI=22) based on a 1% hypopnea desaturation criteria with  confirmation by surrogate arousal indicators. The apneas/hypopneas are accompanied by minimal oxygen desaturation  (percent time below 90% SpO2: 0%, Min SpO2: 91.2%). The average desaturation across all sleep disordered breathing  events is 2.1%. Snoring occurs for 23.2% (30 dB) of the study, 17.1% is very loud. The mean pulse rate is 67 BPM, with very  frequent pulse rate variability (71 events with >= 6 BPM increase/decrease per hour).  TREATMENT CONSIDERATIONS: Consider an attended CPAP titration study. Presence of respiratory control instability  should be evaluated by a sleep specialist prior to initiating positive pressure treatment. Consider nasal continuous  positive airway pressure based on the AHI severity. The patient should avoid sleeping supine; the non-supine AHI is 10.7  times less severe than the supine AHI.  DISEASE MANAGEMENT CONSIDERATIONS: None.       Assessment:       1. BAYLEE (obstructive sleep apnea)    2. Nasal obstruction    3. Snoring    4. Rhinitis, unspecified type    5. Laryngopharyngeal reflux (LPR)    6. Nasal septal deviation    7. Hypertrophy of both inferior nasal turbinates          Plan:         Discussed LPR - start PPI - we discussed his throat symptoms may not be correlating to nose  Flonase every night  Discussed BAYLEE and possible nasal obstruction contributing to PAP tolerance and subj symptoms    Although some nasal concerns, I have low suspision for sinusitis. May have ITH and mild Septal dev contributing to nasal obs so will trend out and may benefit from nasal airway surgery as we look at addressing sleep      This note was generated with the assistance of ambient listening technology. Verbal  consent was obtained by the patient and accompanying visitor(s) for the recording of patient appointment to facilitate this note. I attest to having reviewed and edited the generated note for accuracy, though some syntax or spelling errors may persist. Please contact the author of this note for any clarification.         [1]   Social History  Tobacco Use   Smoking Status Never   Smokeless Tobacco Never

## 2025-05-26 ENCOUNTER — LAB VISIT (OUTPATIENT)
Dept: LAB | Facility: HOSPITAL | Age: 70
End: 2025-05-26
Payer: MEDICARE

## 2025-05-26 DIAGNOSIS — D50.8 OTHER IRON DEFICIENCY ANEMIA: ICD-10-CM

## 2025-05-26 DIAGNOSIS — D75.1 POLYCYTHEMIA: ICD-10-CM

## 2025-05-26 LAB
ABSOLUTE EOSINOPHIL (OHS): 0.18 K/UL
ABSOLUTE MONOCYTE (OHS): 0.61 K/UL (ref 0.3–1)
ABSOLUTE NEUTROPHIL COUNT (OHS): 4.77 K/UL (ref 1.8–7.7)
ALBUMIN SERPL BCP-MCNC: 3.8 G/DL (ref 3.5–5.2)
ALP SERPL-CCNC: 55 UNIT/L (ref 40–150)
ALT SERPL W/O P-5'-P-CCNC: 21 UNIT/L (ref 10–44)
ANION GAP (OHS): 10 MMOL/L (ref 8–16)
AST SERPL-CCNC: 20 UNIT/L (ref 11–45)
BASOPHILS # BLD AUTO: 0.08 K/UL
BASOPHILS NFR BLD AUTO: 1.1 %
BILIRUB SERPL-MCNC: 0.5 MG/DL (ref 0.1–1)
BUN SERPL-MCNC: 17 MG/DL (ref 8–23)
CALCIUM SERPL-MCNC: 9.2 MG/DL (ref 8.7–10.5)
CHLORIDE SERPL-SCNC: 108 MMOL/L (ref 95–110)
CO2 SERPL-SCNC: 21 MMOL/L (ref 23–29)
CREAT SERPL-MCNC: 1.2 MG/DL (ref 0.5–1.4)
ERYTHROCYTE [DISTWIDTH] IN BLOOD BY AUTOMATED COUNT: 14.6 % (ref 11.5–14.5)
FERRITIN SERPL-MCNC: 35 NG/ML (ref 20–300)
GFR SERPLBLD CREATININE-BSD FMLA CKD-EPI: >60 ML/MIN/1.73/M2
GLUCOSE SERPL-MCNC: 125 MG/DL (ref 70–110)
HCT VFR BLD AUTO: 53.5 % (ref 40–54)
HGB BLD-MCNC: 17.6 GM/DL (ref 14–18)
IMM GRANULOCYTES # BLD AUTO: 0.03 K/UL (ref 0–0.04)
IMM GRANULOCYTES NFR BLD AUTO: 0.4 % (ref 0–0.5)
LYMPHOCYTES # BLD AUTO: 1.64 K/UL (ref 1–4.8)
MCH RBC QN AUTO: 27.7 PG (ref 27–31)
MCHC RBC AUTO-ENTMCNC: 32.9 G/DL (ref 32–36)
MCV RBC AUTO: 84 FL (ref 82–98)
NUCLEATED RBC (/100WBC) (OHS): 0 /100 WBC
PLATELET # BLD AUTO: 211 K/UL (ref 150–450)
PMV BLD AUTO: 11.9 FL (ref 9.2–12.9)
POTASSIUM SERPL-SCNC: 4.7 MMOL/L (ref 3.5–5.1)
PROT SERPL-MCNC: 7.6 GM/DL (ref 6–8.4)
RBC # BLD AUTO: 6.36 M/UL (ref 4.6–6.2)
RELATIVE EOSINOPHIL (OHS): 2.5 %
RELATIVE LYMPHOCYTE (OHS): 22.4 % (ref 18–48)
RELATIVE MONOCYTE (OHS): 8.3 % (ref 4–15)
RELATIVE NEUTROPHIL (OHS): 65.3 % (ref 38–73)
SODIUM SERPL-SCNC: 139 MMOL/L (ref 136–145)
WBC # BLD AUTO: 7.31 K/UL (ref 3.9–12.7)

## 2025-05-26 PROCEDURE — 85025 COMPLETE CBC W/AUTO DIFF WBC: CPT | Mod: PN

## 2025-05-26 PROCEDURE — 80053 COMPREHEN METABOLIC PANEL: CPT | Mod: PN

## 2025-05-26 PROCEDURE — 36415 COLL VENOUS BLD VENIPUNCTURE: CPT | Mod: PN

## 2025-05-26 PROCEDURE — 82728 ASSAY OF FERRITIN: CPT

## 2025-05-28 ENCOUNTER — OFFICE VISIT (OUTPATIENT)
Dept: HEMATOLOGY/ONCOLOGY | Facility: CLINIC | Age: 70
End: 2025-05-28
Payer: MEDICARE

## 2025-05-28 VITALS
TEMPERATURE: 98 F | RESPIRATION RATE: 18 BRPM | SYSTOLIC BLOOD PRESSURE: 133 MMHG | WEIGHT: 177 LBS | OXYGEN SATURATION: 99 % | HEART RATE: 60 BPM | HEIGHT: 65 IN | BODY MASS INDEX: 29.49 KG/M2 | DIASTOLIC BLOOD PRESSURE: 84 MMHG

## 2025-05-28 DIAGNOSIS — D50.8 OTHER IRON DEFICIENCY ANEMIA: Primary | ICD-10-CM

## 2025-05-28 DIAGNOSIS — D75.1 POLYCYTHEMIA: Primary | ICD-10-CM

## 2025-05-28 PROCEDURE — 3008F BODY MASS INDEX DOCD: CPT | Mod: CPTII,S$GLB,, | Performed by: INTERNAL MEDICINE

## 2025-05-28 PROCEDURE — 99999 PR PBB SHADOW E&M-EST. PATIENT-LVL III: CPT | Mod: PBBFAC,,, | Performed by: INTERNAL MEDICINE

## 2025-05-28 PROCEDURE — 1101F PT FALLS ASSESS-DOCD LE1/YR: CPT | Mod: CPTII,S$GLB,, | Performed by: INTERNAL MEDICINE

## 2025-05-28 PROCEDURE — 99214 OFFICE O/P EST MOD 30 MIN: CPT | Mod: S$GLB,,, | Performed by: INTERNAL MEDICINE

## 2025-05-28 PROCEDURE — 1159F MED LIST DOCD IN RCRD: CPT | Mod: CPTII,S$GLB,, | Performed by: INTERNAL MEDICINE

## 2025-05-28 PROCEDURE — 3079F DIAST BP 80-89 MM HG: CPT | Mod: CPTII,S$GLB,, | Performed by: INTERNAL MEDICINE

## 2025-05-28 PROCEDURE — 3288F FALL RISK ASSESSMENT DOCD: CPT | Mod: CPTII,S$GLB,, | Performed by: INTERNAL MEDICINE

## 2025-05-28 PROCEDURE — 3075F SYST BP GE 130 - 139MM HG: CPT | Mod: CPTII,S$GLB,, | Performed by: INTERNAL MEDICINE

## 2025-05-28 PROCEDURE — 1126F AMNT PAIN NOTED NONE PRSNT: CPT | Mod: CPTII,S$GLB,, | Performed by: INTERNAL MEDICINE

## 2025-05-28 NOTE — PROGRESS NOTES
Subjective     Patient ID: Mina Viveros Sr. is a 69 y.o. male.    Chief Complaint: No chief complaint on file.    HPI  Mr. Viveros returns today for follow up.  I had seen him initially in May 2024 and his last visit with me was in mid September 2024.  He was also seen by iur NP in February 2025.    On October 25, 2024 he underwent a sleep apnea study that confirmed that he has moderate positional sleep apnea.    His labs from earlier this week are as follws:  Ferritin is 35 ng/ml  His CBC today shows a white count of 7,300 per cubic mm, hemoglobin 17.7 grams/deciliter, hematocrit 53.6 %, MCV 86, and platelets 223K.    Briefly, he is a 68-year-old male referred for evaluation for polycythemia.  His EPO level was not inappropriately low while he was ruled out for JAK2 mutation.    ROS: Overall he feels well.  When asked, he states that he is unable to use the face mask for his sleep apnea.  He again complains of at times non restorative sleep and daytime sleepiness.  His ECOG PS is 1.  He denies any anxiety, depression, easy bruising, fevers, chills, night  sweats, weight loss, nausea, vomiting, diarrhea, constipation, diplopia, blurred vision, headache, chest pain, palpitations, shortness of breath, breast pain, abdominal pain, extremity pain, or difficulty ambulating.  The remainder of the ten-point ROS, including general, skin, lymph, H/N, cardiorespiratory, GI, , Neuro, Endocrine, and psychiatric is negative.     He also states that he has been donating blood on a frequent basis, once every two months.  His last donation was in mid April.    PHYSICAL EXAMINATION  He is alert, oriented to time, place, person, pleasant, well      nourished, in no acute physical distress.   He is here with his wife.                                 VITAL SIGNS:  Reviewed                                      HEENT:  Normal.  There are no nasal, oral, lip, gingival, auricular, lid,    or conjunctival lesions.  Mucosae are moist  and pink, and there is no        thrush.  Pupils are equal, reactive to light and accommodation.              Extraocular muscle movements are intact.    Dentition is good.  There is no frontal or maxillary tenderness.                                   NECK:  Supple without JVD, adenopathy, or thyromegaly.                       LUNGS:  Clear to auscultation without wheezing, rales, or rhonchi.           CARDIOVASCULAR:  Reveals an S1, S2, no murmurs, no rubs, no gallops.         ABDOMEN:  Soft, nontender, without organomegaly.  Bowel sounds are    present.                                                                     EXTREMITIES:  No cyanosis, clubbing, or edema.                                                               LYMPHATIC:  There is no cervical, axillary, or supraclavicular adenopathy.   SKIN:  Warm and moist, without petechiae, rashes, induration, or ecchymoses.           NEUROLOGIC:  DTRs are 0-1+ bilaterally, symmetrical, motor function is 5/5,  and cranial nerves are  within normal limits.    ASSESSMENT  Polycythemia.  I suspect that his polycythemia secondary to sleep apnea  Obstructive Sleep apnea        PLAN  I had a very long discussion with Mr. Viveros and his wife.  At this point we will proceed as follows:  I urged him to continue donating blood and a frequent basis  I advised him to discuss the sleep apnea with his primary care physician inquire about the inspire procedure  We will see him again in 4 months with a repeat CBC  His multiple questions were answered to his satisfaction.    I spent 15 minutes reviewing the available records, and an additional 15 minutes evaluating the patient, in counseling and coordinating his care.      Route Chart for Scheduling    Med Onc Chart Routing      Follow up with physician 4 months. with a CBC and ferritin   Follow up with ARIANNA    Infusion scheduling note    Injection scheduling note    Labs    Imaging    Pharmacy appointment    Other  referrals

## 2025-08-12 ENCOUNTER — PATIENT OUTREACH (OUTPATIENT)
Dept: ADMINISTRATIVE | Facility: HOSPITAL | Age: 70
End: 2025-08-12
Payer: MEDICARE

## 2025-08-18 ENCOUNTER — OFFICE VISIT (OUTPATIENT)
Dept: FAMILY MEDICINE | Facility: CLINIC | Age: 70
End: 2025-08-18
Payer: MEDICARE

## 2025-08-18 VITALS
WEIGHT: 180.19 LBS | BODY MASS INDEX: 30.02 KG/M2 | SYSTOLIC BLOOD PRESSURE: 136 MMHG | DIASTOLIC BLOOD PRESSURE: 74 MMHG | OXYGEN SATURATION: 97 % | HEIGHT: 65 IN | RESPIRATION RATE: 17 BRPM | HEART RATE: 67 BPM | TEMPERATURE: 98 F

## 2025-08-18 DIAGNOSIS — Z12.11 COLON CANCER SCREENING: ICD-10-CM

## 2025-08-18 DIAGNOSIS — R21 RASH AND NONSPECIFIC SKIN ERUPTION: Primary | ICD-10-CM

## 2025-08-18 PROCEDURE — 99999 PR PBB SHADOW E&M-EST. PATIENT-LVL V: CPT | Mod: PBBFAC,,, | Performed by: NURSE PRACTITIONER

## 2025-08-18 PROCEDURE — 3078F DIAST BP <80 MM HG: CPT | Mod: CPTII,S$GLB,, | Performed by: NURSE PRACTITIONER

## 2025-08-18 PROCEDURE — 3075F SYST BP GE 130 - 139MM HG: CPT | Mod: CPTII,S$GLB,, | Performed by: NURSE PRACTITIONER

## 2025-08-18 PROCEDURE — 96372 THER/PROPH/DIAG INJ SC/IM: CPT | Mod: S$GLB,,, | Performed by: NURSE PRACTITIONER

## 2025-08-18 PROCEDURE — 1126F AMNT PAIN NOTED NONE PRSNT: CPT | Mod: CPTII,S$GLB,, | Performed by: NURSE PRACTITIONER

## 2025-08-18 PROCEDURE — 1160F RVW MEDS BY RX/DR IN RCRD: CPT | Mod: CPTII,S$GLB,, | Performed by: NURSE PRACTITIONER

## 2025-08-18 PROCEDURE — 3008F BODY MASS INDEX DOCD: CPT | Mod: CPTII,S$GLB,, | Performed by: NURSE PRACTITIONER

## 2025-08-18 PROCEDURE — 1101F PT FALLS ASSESS-DOCD LE1/YR: CPT | Mod: CPTII,S$GLB,, | Performed by: NURSE PRACTITIONER

## 2025-08-18 PROCEDURE — 1159F MED LIST DOCD IN RCRD: CPT | Mod: CPTII,S$GLB,, | Performed by: NURSE PRACTITIONER

## 2025-08-18 PROCEDURE — 3288F FALL RISK ASSESSMENT DOCD: CPT | Mod: CPTII,S$GLB,, | Performed by: NURSE PRACTITIONER

## 2025-08-18 PROCEDURE — 99214 OFFICE O/P EST MOD 30 MIN: CPT | Mod: 25,S$GLB,, | Performed by: NURSE PRACTITIONER

## 2025-08-18 RX ORDER — PREDNISONE 20 MG/1
20 TABLET ORAL DAILY
Qty: 5 TABLET | Refills: 0 | Status: SHIPPED | OUTPATIENT
Start: 2025-08-18 | End: 2025-08-23

## 2025-08-18 RX ORDER — TRIAMCINOLONE ACETONIDE 5 MG/G
CREAM TOPICAL 2 TIMES DAILY
Qty: 454 G | Refills: 0 | Status: SHIPPED | OUTPATIENT
Start: 2025-08-18

## 2025-08-18 RX ORDER — CEPHALEXIN 500 MG/1
500 CAPSULE ORAL 4 TIMES DAILY
Qty: 28 CAPSULE | Refills: 0 | Status: SHIPPED | OUTPATIENT
Start: 2025-08-18 | End: 2025-08-25

## 2025-08-18 RX ORDER — DEXAMETHASONE SODIUM PHOSPHATE 4 MG/ML
8 INJECTION, SOLUTION INTRA-ARTICULAR; INTRALESIONAL; INTRAMUSCULAR; INTRAVENOUS; SOFT TISSUE
Status: COMPLETED | OUTPATIENT
Start: 2025-08-18 | End: 2025-08-18

## 2025-08-18 RX ADMIN — DEXAMETHASONE SODIUM PHOSPHATE 8 MG: 4 INJECTION, SOLUTION INTRA-ARTICULAR; INTRALESIONAL; INTRAMUSCULAR; INTRAVENOUS; SOFT TISSUE at 09:08

## 2025-08-22 ENCOUNTER — PATIENT MESSAGE (OUTPATIENT)
Dept: FAMILY MEDICINE | Facility: CLINIC | Age: 70
End: 2025-08-22
Payer: MEDICARE